# Patient Record
Sex: FEMALE | Race: WHITE | Employment: FULL TIME | URBAN - METROPOLITAN AREA
[De-identification: names, ages, dates, MRNs, and addresses within clinical notes are randomized per-mention and may not be internally consistent; named-entity substitution may affect disease eponyms.]

---

## 2017-04-17 ENCOUNTER — DOCUMENTATION ONLY (OUTPATIENT)
Dept: FAMILY MEDICINE CLINIC | Age: 42
End: 2017-04-17

## 2017-04-17 NOTE — PROGRESS NOTES
Rec'd medical records request from Jesusita Gamboa, faxed request to [employer] for processing on 04/14/17, confirmation rec'd.

## 2017-05-24 ENCOUNTER — OFFICE VISIT (OUTPATIENT)
Dept: FAMILY MEDICINE CLINIC | Age: 42
End: 2017-05-24

## 2017-05-24 VITALS
SYSTOLIC BLOOD PRESSURE: 112 MMHG | WEIGHT: 197 LBS | TEMPERATURE: 100 F | OXYGEN SATURATION: 99 % | HEIGHT: 68 IN | HEART RATE: 83 BPM | RESPIRATION RATE: 16 BRPM | BODY MASS INDEX: 29.86 KG/M2 | DIASTOLIC BLOOD PRESSURE: 83 MMHG

## 2017-05-24 DIAGNOSIS — G43.909 MIGRAINE WITHOUT STATUS MIGRAINOSUS, NOT INTRACTABLE, UNSPECIFIED MIGRAINE TYPE: Primary | ICD-10-CM

## 2017-05-24 RX ORDER — KETOROLAC TROMETHAMINE 30 MG/ML
60 INJECTION, SOLUTION INTRAMUSCULAR; INTRAVENOUS ONCE
Qty: 1 VIAL | Refills: 0
Start: 2017-05-24 | End: 2017-05-24

## 2017-05-24 RX ORDER — PREDNISONE 20 MG/1
20 TABLET ORAL 3 TIMES DAILY
Qty: 9 TAB | Refills: 0 | Status: SHIPPED | OUTPATIENT
Start: 2017-05-24 | End: 2017-07-18 | Stop reason: ALTCHOICE

## 2017-05-24 RX ORDER — SUMATRIPTAN 20 MG/1
SPRAY NASAL
Qty: 6 CONTAINER | Refills: 5 | Status: SHIPPED | OUTPATIENT
Start: 2017-05-24 | End: 2019-11-26

## 2017-05-24 RX ORDER — TOPIRAMATE 25 MG/1
TABLET ORAL
Qty: 70 TAB | Refills: 0 | Status: SHIPPED | OUTPATIENT
Start: 2017-05-24 | End: 2017-07-31 | Stop reason: DRUGHIGH

## 2017-05-24 NOTE — MR AVS SNAPSHOT
Visit Information Date & Time Provider Department Dept. Phone Encounter #  
 5/24/2017  9:15 AM Hoda Hidalgo, NP [address] [facility] [phone] [id number] Follow-up Instructions Return if symptoms worsen or fail to improve. Your Appointments 7/18/2017  2:00 PM  
ESTABLISHED PATIENT with MD Gil Machado ([address] [facility]) Appt Note: AE/MAMMO DM pt  
 [address] [facility] [address] [place] [address]  
[phone]  
  
   
 [address]  
  
    
 7/18/2017  3:00 PM  
MAMMOGRAPHY with MAMMOGRAM, JOCELYNE Lacey ([address] [facility]) Appt Note: AE/MAMMO DM pt  
 [address] [facility] [address] [facility] [address]  
[address] Upcoming Health Maintenance Date Due DTaP/Tdap/Td series (1 - Tdap) 4/9/1996 PAP AKA CERVICAL CYTOLOGY 11/25/2016 COLONOSCOPY 11/1/2017 INFLUENZA AGE 9 TO ADULT 8/1/2017 Allergies as of 5/24/2017  Review Complete On: 5/24/2017 By: Cherrie Sow LPN Severity Noted Reaction Type Reactions Phenergan [Promethazine]  11/11/2013    Unknown (comments) Current Immunizations  Reviewed on 4/26/2016 Name Date Influenza Vaccine 10/15/2015 Not reviewed this visit You Were Diagnosed With   
  
 Codes Comments Migraine without status migrainosus, not intractable, unspecified migraine type    -  Primary ICD-10-CM: Y93.863 ICD-9-CM: 346.90 Vitals BP Pulse Temp Resp Height(growth percentile) Weight(growth percentile) 112/83 (BP 1 Location: Right arm, BP Patient Position: Sitting) 83 100 °F (37.8 °C) (Oral) 16 5' 8\" (1.727 m) 197 lb (89.4 kg) SpO2 BMI OB Status Smoking Status 99% 29.95 kg/m2 Hysterectomy Former Smoker Vitals History BMI and BSA Data  Body Mass Index Body Surface Area  
 29.95 kg/m 2 2.07 m 2  
 Preferred Pharmacy Pharmacy Name Phone [facility] [address] [facility]  [facility] [phone] Your Updated Medication List  
  
   
This list is accurate as of: 5/24/17  9:40 AM.  Always use your most recent med list.  
  
  
  
  
 ABILIFY 5 mg tablet Generic drug:  ARIPiprazole ADDERALL 10 mg tablet Generic drug:  dextroamphetamine-amphetamine Take 10 mg by mouth.  
  
 estradiol 2 mg (7.5 mcg /24 hour) vaginal ring Commonly known as:  ESTRING Insert ring vaginally. Replace every 3 months  
  
 gabapentin 300 mg capsule Commonly known as:  NEURONTIN  
  
 ketorolac tromethamine 60 mg/2 mL Soln Commonly known as:  TORADOL  
2 mL by IntraMUSCular route once for 1 dose. oxyCODONE IR 10 mg Tab immediate release tablet Commonly known as:  ROXICODONE  
  
 predniSONE 20 mg tablet Commonly known as:  Sherra Juan Take 1 Tab by mouth three (3) times daily. SUMAtriptan 20 mg/actuation nasal spray Commonly known as:  IMITREX Use 1 spray in each nostril at onset of migraine, may repeat in 2 hrs if needed  
  
 topiramate 25 mg tablet Commonly known as:  TOPAMAX Take 1 po qam x 1 wk, then Take 1 po BID x 1 wk, then Take 2 in AM and 1 in PM x 1 wk, then Take 2 po BID  
  
 traZODone 150 mg tablet Commonly known as:  DESYREL  
  
 varenicline 0.5 mg (11)- 1 mg (42) Dspk Commonly known as:  CHANTIX STARTER MARIANO Take 1 mg by mouth two (2) times daily (after meals). Prescriptions Sent to Pharmacy Refills  
 predniSONE (DELTASONE) 20 mg tablet 0 Sig: Take 1 Tab by mouth three (3) times daily. Class: Normal  
 Pharmacy: [facility] [place], [place] [address] [address] [place] [address] #: [phone] Route: Oral  
 topiramate (TOPAMAX) 25 mg tablet 0  Sig: Take 1 po qam x 1 wk, then Take 1 po BID x 1 wk, then Take 2 in AM and 1 in PM x 1 wk, then Take 2 po BID Class: Normal  
 [facility] [address] [facility] Ph #: [phone] SUMAtriptan (IMITREX) 20 mg/actuation nasal spray 5 Sig: Use 1 spray in each nostril at onset of migraine, may repeat in 2 hrs if needed Class: Normal  
 Pharmacy: [facility] [place] [facility] [address] [facility] Ph #: [phone] We Performed the Following KETOROLAC TROMETHAMINE INJ [ [facility]] [place] THER/PROPH/DIAG INJECTION, SUBCUT/IM [id number] CPT(R)] Follow-up Instructions Return if symptoms worsen or fail to improve. Patient Instructions Migraine Headache: Care Instructions Your Care Instructions Migraines are painful, throbbing headaches that often start on one side of the head. They may cause nausea and vomiting and make you sensitive to light, sound, or smell. Without treatment, migraines can last from 4 hours to a few days. Medicines can help prevent migraines or stop them after they have started. Your doctor can help you find which ones work best for you. Follow-up care is a key part of your treatment and safety. Be sure to make and go to all appointments, and call your doctor if you are having problems. It's also a good idea to know your test results and keep a list of the medicines you take. How can you care for yourself at home? · Do not drive if you have taken a prescription pain medicine. · Rest in a quiet, dark room until your headache is gone. Close your eyes, and try to relax or go to sleep. Don't watch TV or read. · Put a cold, moist cloth or cold pack on the painful area for 10 to 20 minutes at a time. Put a thin cloth between the cold pack and your skin. · Use a warm, moist towel or a heating pad set on low to relax tight shoulder and neck muscles. · Have someone gently massage your neck and shoulders. · Take your medicines exactly as prescribed. Call your doctor if you think you are having a problem with your medicine. You will get more details on the specific medicines your doctor prescribes. · Be careful not to take pain medicine more often than the instructions allow. You could get worse or more frequent headaches when the medicine wears off. To prevent migraines · Keep a headache diary so you can figure out what triggers your headaches. Avoiding triggers may help you prevent headaches. Record when each headache began, how long it lasted, and what the pain was like. (Was it throbbing, aching, stabbing, or dull?) Write down any other symptoms you had with the headache, such as nausea, flashing lights or dark spots, or sensitivity to bright light or loud noise. Note if the headache occurred near your period. List anything that might have triggered the headache. Triggers may include certain foods (chocolate, cheese, wine) or odors, smoke, bright light, stress, or lack of sleep. · If your doctor has prescribed medicine for your migraines, take it as directed. You may have medicine that you take only when you get a migraine and medicine that you take all the time to help prevent migraines. ¨ If your doctor has prescribed medicine for when you get a headache, take it at the first sign of a migraine, unless your doctor has given you other instructions. ¨ If your doctor has prescribed medicine to prevent migraines, take it exactly as prescribed. Call your doctor if you think you are having a problem with your medicine. · Find healthy ways to deal with stress. Migraines are most common during or right after stressful times. Take time to relax before and after you do something that has caused a migraine in the past. 
· Try to keep your muscles relaxed by keeping good posture.  Check your jaw, face, neck, and shoulder muscles for tension. Try to relax them. When you sit at a desk, change positions often. And make sure to stretch for 30 seconds each hour. · Get plenty of sleep and exercise. · Eat meals on a regular schedule. Avoid foods and drinks that often trigger migraines. These include chocolate, alcohol (especially red wine and port), aspartame, monosodium glutamate (MSG), and some additives found in foods (such as hot dogs, rosenberg, cold cuts, aged cheeses, and pickled foods). · Limit caffeine. Don't drink too much coffee, tea, or soda. But don't quit caffeine suddenly. That can also give you migraines. · Do not smoke or allow others to smoke around you. If you need help quitting, talk to your doctor about stop-smoking programs and medicines. These can increase your chances of quitting for good. · If you are taking birth control pills or hormone therapy, talk to your doctor about whether they are triggering your migraines. When should you call for help? Call 911 anytime you think you may need emergency care. For example, call if: 
· You have signs of a stroke. These may include: 
¨ Sudden numbness, paralysis, or weakness in your face, arm, or leg, especially on only one side of your body. ¨ Sudden vision changes. ¨ Sudden trouble speaking. ¨ Sudden confusion or trouble understanding simple statements. ¨ Sudden problems with walking or balance. ¨ A sudden, severe headache that is different from past headaches. Call your doctor now or seek immediate medical care if: 
· You have new or worse nausea and vomiting. · You have a new or higher fever. · Your headache gets much worse. Watch closely for changes in your health, and be sure to contact your doctor if: 
· You are not getting better after 2 days (48 hours). Where can you learn more? Go to http://curtis-harriett.info/. Enter G880 in the search box to learn more about \"Migraine Headache: Care Instructions. \" 
 Current as of: October 14, 2016 Content Version: 11.2 © 7562-3166 [employer] Care instructions adapted under license by [employer] (which disclaims liability or warranty for this information). If you have questions about a medical condition or this instruction, always ask your healthcare professional. [address] any warranty or liability for your use of this information. Introducing [facility]! Dear Amalia Jackson: Thank you for requesting a [employer] account. Our records indicate that you already have an active [employer] account. You can access your account anytime at [email or web address] Did you know that you can access your hospital and ER discharge instructions at any time in [employer]? You can also review all of your test results from your hospital stay or ER visit. Additional Information If you have questions, please visit the Frequently Asked Questions section of the [employer] website at [email or web address] Remember, [employer] is NOT to be used for urgent needs. For medical emergencies, dial 911. Now available from your iPhone and Android! Please provide this summary of care documentation to your next provider. Your primary care clinician is listed as TOBIAS LUND. If you have any questions after today's visit, please call [phone].

## 2017-05-24 NOTE — PROGRESS NOTES
Chief Complaint   Patient presents with    Headache     Started early this morning     she is a 43y.o. year old female who presents for evalution. Pt started with migraine at 4am this morning. Took Imitrex but hasn't helped, only took 1 dose. Has also been on Maxalt previously but did not help either. States migraine have been increasing in frequency past week or two. Reviewed PmHx, RxHx, FmHx, SocHx, AllgHx and updated and dated in the chart. Review of Systems - negative except as listed above in the HPI    Objective:     Vitals:    05/24/17 0927   BP: 112/83   Pulse: 83   Resp: 16   Temp: 100 °F (37.8 °C)   TempSrc: Oral   SpO2: 99%   Weight: 197 lb (89.4 kg)   Height: 5' 8\" (1.727 m)     Physical Examination: General appearance - alert, well appearing, and in mild to moderate distress and crying  Chest - clear to auscultation, no wheezes, rales or rhonchi, symmetric air entry  Heart - normal rate, regular rhythm, normal S1, S2, no murmurs, rubs, clicks or gallops  Neurological - alert, oriented, normal speech, no focal findings or movement disorder noted, cranial nerves II through XII intact, motor and sensory grossly normal bilaterally    Assessment/ Plan:   Chepe Maciel was seen today for headache. Diagnoses and all orders for this visit:    Migraine without status migrainosus, not intractable, unspecified migraine type  -     ketorolac tromethamine (TORADOL) 60 mg/2 mL soln; 2 mL by IntraMUSCular route once for 1 dose. -     KETOROLAC TROMETHAMINE INJ  -     [place] THER/PROPH/DIAG INJECTION, SUBCUT/IM  -     predniSONE (DELTASONE) 20 mg tablet; Take 1 Tab by mouth three (3) times daily. -     topiramate (TOPAMAX) 25 mg tablet; Take 1 po qam x 1 wk, then Take 1 po BID x 1 wk, then Take 2 in AM and 1 in PM x 1 wk, then Take 2 po BID  -     SUMAtriptan (IMITREX) 20 mg/actuation nasal spray; Use 1 spray in each nostril at onset of migraine, may repeat in 2 hrs if needed  New rxs.  F/U prn     Pt voiced understanding regarding plan of care. Follow-up Disposition:  Return if symptoms worsen or fail to improve. I have discussed the diagnosis with the patient and the intended plan as seen in the above orders. The patient has received an after-visit summary and questions were answered concerning future plans.      Medication Side Effects and Warnings were discussed with patient    Yomaira Kirk NP

## 2017-05-24 NOTE — PROGRESS NOTES
1. Have you been to the ER, urgent care clinic since your last visit? Hospitalized since your last visit? No    2. Have you seen or consulted any other health care providers outside of the [address] [place] [address] since your last visit? Include any pap smears or colon screening.  No     Chief Complaint   Patient presents with    Headache     Started early this morning

## 2017-05-24 NOTE — PATIENT INSTRUCTIONS
Migraine Headache: Care Instructions  Your Care Instructions  Migraines are painful, throbbing headaches that often start on one side of the head. They may cause nausea and vomiting and make you sensitive to light, sound, or smell. Without treatment, migraines can last from 4 hours to a few days. Medicines can help prevent migraines or stop them after they have started. Your doctor can help you find which ones work best for you. Follow-up care is a key part of your treatment and safety. Be sure to make and go to all appointments, and call your doctor if you are having problems. It's also a good idea to know your test results and keep a list of the medicines you take. How can you care for yourself at home? · Do not drive if you have taken a prescription pain medicine. · Rest in a quiet, dark room until your headache is gone. Close your eyes, and try to relax or go to sleep. Don't watch TV or read. · Put a cold, moist cloth or cold pack on the painful area for 10 to 20 minutes at a time. Put a thin cloth between the cold pack and your skin. · Use a warm, moist towel or a heating pad set on low to relax tight shoulder and neck muscles. · Have someone gently massage your neck and shoulders. · Take your medicines exactly as prescribed. Call your doctor if you think you are having a problem with your medicine. You will get more details on the specific medicines your doctor prescribes. · Be careful not to take pain medicine more often than the instructions allow. You could get worse or more frequent headaches when the medicine wears off. To prevent migraines  · Keep a headache diary so you can figure out what triggers your headaches. Avoiding triggers may help you prevent headaches. Record when each headache began, how long it lasted, and what the pain was like.  (Was it throbbing, aching, stabbing, or dull?) Write down any other symptoms you had with the headache, such as nausea, flashing lights or dark spots, or sensitivity to bright light or loud noise. Note if the headache occurred near your period. List anything that might have triggered the headache. Triggers may include certain foods (chocolate, cheese, wine) or odors, smoke, bright light, stress, or lack of sleep. · If your doctor has prescribed medicine for your migraines, take it as directed. You may have medicine that you take only when you get a migraine and medicine that you take all the time to help prevent migraines. ¨ If your doctor has prescribed medicine for when you get a headache, take it at the first sign of a migraine, unless your doctor has given you other instructions. ¨ If your doctor has prescribed medicine to prevent migraines, take it exactly as prescribed. Call your doctor if you think you are having a problem with your medicine. · Find healthy ways to deal with stress. Migraines are most common during or right after stressful times. Take time to relax before and after you do something that has caused a migraine in the past.  · Try to keep your muscles relaxed by keeping good posture. Check your jaw, face, neck, and shoulder muscles for tension. Try to relax them. When you sit at a desk, change positions often. And make sure to stretch for 30 seconds each hour. · Get plenty of sleep and exercise. · Eat meals on a regular schedule. Avoid foods and drinks that often trigger migraines. These include chocolate, alcohol (especially red wine and port), aspartame, monosodium glutamate (MSG), and some additives found in foods (such as hot dogs, rosenberg, cold cuts, aged cheeses, and pickled foods). · Limit caffeine. Don't drink too much coffee, tea, or soda. But don't quit caffeine suddenly. That can also give you migraines. · Do not smoke or allow others to smoke around you. If you need help quitting, talk to your doctor about stop-smoking programs and medicines. These can increase your chances of quitting for good.   · If you are taking birth control pills or hormone therapy, talk to your doctor about whether they are triggering your migraines. When should you call for help? Call 911 anytime you think you may need emergency care. For example, call if:  · You have signs of a stroke. These may include:  ¨ Sudden numbness, paralysis, or weakness in your face, arm, or leg, especially on only one side of your body. ¨ Sudden vision changes. ¨ Sudden trouble speaking. ¨ Sudden confusion or trouble understanding simple statements. ¨ Sudden problems with walking or balance. ¨ A sudden, severe headache that is different from past headaches. Call your doctor now or seek immediate medical care if:  · You have new or worse nausea and vomiting. · You have a new or higher fever. · Your headache gets much worse. Watch closely for changes in your health, and be sure to contact your doctor if:  · You are not getting better after 2 days (48 hours). Where can you learn more? Go to http://curtis-harriett.info/. Enter J289 in the search box to learn more about \"Migraine Headache: Care Instructions. \"  Current as of: October 14, 2016  Content Version: 11.2  © 5351-5246 [employer]. Care instructions adapted under license by [employer] (which disclaims liability or warranty for this information). If you have questions about a medical condition or this instruction, always ask your healthcare professional. [address] any warranty or liability for your use of this information.

## 2017-06-08 ENCOUNTER — OFFICE VISIT (OUTPATIENT)
Dept: FAMILY MEDICINE CLINIC | Age: 42
End: 2017-06-08

## 2017-06-08 VITALS
TEMPERATURE: 99.6 F | HEART RATE: 104 BPM | HEIGHT: 69 IN | WEIGHT: 199 LBS | SYSTOLIC BLOOD PRESSURE: 123 MMHG | DIASTOLIC BLOOD PRESSURE: 79 MMHG | OXYGEN SATURATION: 99 % | BODY MASS INDEX: 29.47 KG/M2 | RESPIRATION RATE: 16 BRPM

## 2017-06-08 DIAGNOSIS — M54.42 ACUTE MIDLINE LOW BACK PAIN WITH LEFT-SIDED SCIATICA: Primary | ICD-10-CM

## 2017-06-08 RX ORDER — VORTIOXETINE 10 MG/1
TABLET, FILM COATED ORAL
Refills: 5 | COMMUNITY
Start: 2017-05-26 | End: 2017-12-04

## 2017-06-08 RX ORDER — GABAPENTIN 400 MG/1
CAPSULE ORAL
COMMUNITY
Start: 2017-06-07 | End: 2017-12-04

## 2017-06-08 RX ORDER — METHOCARBAMOL 750 MG/1
750 TABLET, FILM COATED ORAL 3 TIMES DAILY
Qty: 60 TAB | Refills: 0 | Status: SHIPPED | OUTPATIENT
Start: 2017-06-08 | End: 2017-08-27 | Stop reason: SDUPTHER

## 2017-06-08 RX ORDER — DICLOFENAC SODIUM 75 MG/1
75 TABLET, DELAYED RELEASE ORAL
Qty: 60 TAB | Refills: 0 | Status: SHIPPED | OUTPATIENT
Start: 2017-06-08 | End: 2017-07-18

## 2017-06-08 RX ORDER — KETOROLAC TROMETHAMINE 30 MG/ML
60 INJECTION, SOLUTION INTRAMUSCULAR; INTRAVENOUS ONCE
Qty: 1 VIAL | Refills: 0
Start: 2017-06-08 | End: 2017-06-08

## 2017-06-08 NOTE — PROGRESS NOTES
Chief Complaint   Patient presents with    Spasms     Started noon on yesterday     she is a 43y.o. year old female who presents for evalution. Tuesday night pt started having some pain in back, thought had just \"tweaked it\". Yesterday pain was worse, started having spasms. Has been taking Tylenol, Ibuprofen, heat and ice. At first was midline and radiating to R side in same place. Now radiating down into left side and down leg, radiating more around R side as well. Reviewed PmHx, RxHx, FmHx, SocHx, AllgHx and updated and dated in the chart. Review of Systems - negative except as listed above in the HPI    Objective:     Vitals:    06/08/17 1437   BP: 123/79   Pulse: (!) 104   Resp: 16   Temp: 99.6 °F (37.6 °C)   TempSrc: Oral   SpO2: 99%   Weight: 199 lb (90.3 kg)   Height: 5' 9\" (1.753 m)     Physical Examination: General appearance - alert, well appearing, and in no distress  Chest - clear to auscultation, no wheezes, rales or rhonchi, symmetric air entry  Heart - normal rate, regular rhythm, normal S1, S2, no murmurs, rubs, clicks or gallops  Back exam - limited range of motion, pain with motion noted during exam, tenderness noted lower lumbar and SI joints bilaterally, paraspinous muscle spasm     Assessment/ Plan:   Rd Klein was seen today for spasms. Diagnoses and all orders for this visit:    Acute midline low back pain with left-sided sciatica  -     ketorolac tromethamine (TORADOL) 60 mg/2 mL soln; 2 mL by IntraMUSCular route once for 1 dose. -     KETOROLAC TROMETHAMINE INJ  -     [place] THER/PROPH/DIAG INJECTION, SUBCUT/IM  -     methocarbamol (ROBAXIN) 750 mg tablet; Take 1 Tab by mouth three (3) times daily. -     diclofenac EC (VOLTAREN) 75 mg EC tablet; Take 1 Tab by mouth two (2) times daily (after meals). New rxs. Activity modification, gentle stretching. F/U prn     Pt voiced understanding regarding plan of care.      Follow-up Disposition:  Return if symptoms worsen or fail to improve. I have discussed the diagnosis with the patient and the intended plan as seen in the above orders. The patient has received an after-visit summary and questions were answered concerning future plans.      Medication Side Effects and Warnings were discussed with patient    Elaine Crane NP

## 2017-06-08 NOTE — PROGRESS NOTES
1. Have you been to the ER, urgent care clinic since your last visit? Hospitalized since your last visit? No    2. Have you seen or consulted any other health care providers outside of the [address] [place] [address] since your last visit? Include any pap smears or colon screening.  No     Chief Complaint   Patient presents with    Spasms     Started noon on yesterday

## 2017-06-08 NOTE — MR AVS SNAPSHOT
Visit Information Date & Time Provider Department Dept. Phone Encounter #  
 6/8/2017  2:30 PM Ashleigh Bobby, NP [address] [facility] [phone] [id number] Follow-up Instructions Return if symptoms worsen or fail to improve. Your Appointments 7/18/2017  2:00 PM  
ESTABLISHED PATIENT with [address], MD Gil Lacey ([facility] Appt Note: AE/MAMMO DM pt  
 [address] [place] [address]  
[phone]  
  
   
 [address]  
  
    
 7/18/2017  3:00 PM  
MAMMOGRAPHY with MAMMOGRAM, JOCELYNE Lacey ([facility]) Appt Note: AE/MAMMO DM pt  
 [address] [facility] [address] [address]  
[address] Upcoming Health Maintenance Date Due DTaP/Tdap/Td series (1 - Tdap) 4/9/1996 PAP AKA CERVICAL CYTOLOGY 11/25/2016 COLONOSCOPY 11/1/2017 INFLUENZA AGE 9 TO ADULT 8/1/2017 Allergies as of 6/8/2017  Review Complete On: 6/8/2017 By: Sole Bey LPN Severity Noted Reaction Type Reactions Phenergan [Promethazine]  11/11/2013    Unknown (comments) Current Immunizations  Reviewed on 4/26/2016 Name Date Influenza Vaccine 10/15/2015 Not reviewed this visit You Were Diagnosed With   
  
 Codes Comments Acute midline low back pain with left-sided sciatica    -  Primary ICD-10-CM: M54.42 
ICD-9-CM: 724.2, 724.3 Vitals BP Pulse Temp Resp Height(growth percentile) Weight(growth percentile) 123/79 (!) 104 99.6 °F (37.6 °C) (Oral) 16 5' 9\" (1.753 m) 199 lb (90.3 kg) SpO2 BMI OB Status Smoking Status 99% 29.39 kg/m2 Hysterectomy Former Smoker Vitals History BMI and BSA Data Body Mass Index Body Surface Area  
 29.39 kg/m 2 2.1 m 2 Preferred Pharmacy Pharmacy Name Phone [facility], [address] AT [facility] OF  [facility] [phone] Your Updated Medication List  
  
   
This list is accurate as of: 6/8/17  2:46 PM.  Always use your most recent med list.  
  
  
  
  
 ABILIFY 5 mg tablet Generic drug:  ARIPiprazole ADDERALL 10 mg tablet Generic drug:  dextroamphetamine-amphetamine Take 10 mg by mouth. diclofenac EC 75 mg EC tablet Commonly known as:  VOLTAREN Take 1 Tab by mouth two (2) times daily (after meals). estradiol 2 mg (7.5 mcg /24 hour) vaginal ring Commonly known as:  ESTRING Insert ring vaginally. Replace every 3 months  
  
 gabapentin 300 mg capsule Commonly known as:  NEURONTIN  
  
 ketorolac tromethamine 60 mg/2 mL Soln Commonly known as:  TORADOL  
2 mL by IntraMUSCular route once for 1 dose. methocarbamol 750 mg tablet Commonly known as:  ROBAXIN Take 1 Tab by mouth three (3) times daily. oxyCODONE IR 10 mg Tab immediate release tablet Commonly known as:  ROXICODONE  
  
 predniSONE 20 mg tablet Commonly known as:  Fiordaliza Duane Take 1 Tab by mouth three (3) times daily. SUMAtriptan 20 mg/actuation nasal spray Commonly known as:  IMITREX Use 1 spray in each nostril at onset of migraine, may repeat in 2 hrs if needed  
  
 topiramate 25 mg tablet Commonly known as:  TOPAMAX Take 1 po qam x 1 wk, then Take 1 po BID x 1 wk, then Take 2 in AM and 1 in PM x 1 wk, then Take 2 po BID  
  
 traZODone 150 mg tablet Commonly known as:  DESYREL  
  
 varenicline 0.5 mg (11)- 1 mg (42) Dspk Commonly known as:  CHANTIX STARTER MARIANO Take 1 mg by mouth two (2) times daily (after meals). Prescriptions Sent to Pharmacy Refills  
 methocarbamol (ROBAXIN) 750 mg tablet 0 Sig: Take 1 Tab by mouth three (3) times daily.   
 Class: Normal  
 Pharmacy: [address] [facility], [address] [facility] [address] [facility] Ph #: [phone] Route: Oral  
 diclofenac EC (VOLTAREN) 75 mg EC tablet 0 Sig: Take 1 Tab by mouth two (2) times daily (after meals). Class: Normal  
 Pharmacy: [facility] [place], [address] [facility] Ph #: [phone] Route: Oral  
  
We Performed the Following KETOROLAC TROMETHAMINE INJ [ [facility]] MS THER/PROPH/DIAG INJECTION, SUBCUT/IM [id number] CPT(R)] Follow-up Instructions Return if symptoms worsen or fail to improve. Introducing \[facility]\"" & HEALTH SERVICES! Dear Louie Moraes: Thank you for requesting a [employer] account. Our records indicate that you already have an active [employer] account. You can access your account anytime at [email or web address] Did you know that you can access your hospital and ER discharge instructions at any time in [employer]? You can also review all of your test results from your hospital stay or ER visit. Additional Information If you have questions, please visit the Frequently Asked Questions section of the [employer] website at [email or web address] Remember, [employer] is NOT to be used for urgent needs. For medical emergencies, dial 911. Now available from your iPhone and Android! Please provide this summary of care documentation to your next provider. Your primary care clinician is listed as TOBIAS LUND. If you have any questions after today's visit, please call [phone].

## 2017-07-18 ENCOUNTER — APPOINTMENT (OUTPATIENT)
Dept: MAMMOGRAPHY | Age: 42
End: 2017-07-18

## 2017-07-18 ENCOUNTER — OFFICE VISIT (OUTPATIENT)
Dept: OBGYN CLINIC | Age: 42
End: 2017-07-18

## 2017-07-18 VITALS
WEIGHT: 201 LBS | HEART RATE: 77 BPM | DIASTOLIC BLOOD PRESSURE: 65 MMHG | HEIGHT: 69 IN | BODY MASS INDEX: 29.77 KG/M2 | SYSTOLIC BLOOD PRESSURE: 101 MMHG

## 2017-07-18 DIAGNOSIS — Z01.419 ENCOUNTER FOR GYNECOLOGICAL EXAMINATION: Primary | ICD-10-CM

## 2017-07-18 DIAGNOSIS — Z12.31 ENCOUNTER FOR SCREENING MAMMOGRAM FOR MALIGNANT NEOPLASM OF BREAST: ICD-10-CM

## 2017-07-18 NOTE — PATIENT INSTRUCTIONS

## 2017-07-18 NOTE — PROGRESS NOTES
Annual exam ages 40-58 post hysterectomy    Eneida Mayfield is a ,  43 y.o. female [facility] No LMP recorded. Patient has had a hysterectomy. PARADISE/BSO for endometriosis. She presents for her annual checkup. She is having some vaginal dryness. Needs refill of Estring . Occ UMESH. Trouble losing weight. Doesn't exercise regularly. Not following specific diet, but generally eats healthy. [employer]. Doesn't snack. Cut out diet sodas. With regard to the Gardasil vaccine, she is older than the age for which it is FDA approved. Hormonal status:  She reports no perimenstrual type symptoms. She is not having vasomotor symptoms. The patient is on Estring. Ran out, wants RX. Sexual history:    She  reports that she currently engages in sexual activity and has had male partners. She reports using the following method of birth control/protection: Surgical.    Medical conditions:    Since her last annual GYN exam about one year ago, she has not the following changes in her health history: none. Surgical history confirmed with patient. has a past surgical history that includes hysteroscopy with endometrial ablation; appendectomy; colectomy; colonoscopy; dilation and curettage; paradise and bso (07); and orthopaedic (2013). Pap and Mammogram History:    Her most recent Pap smear was normal, obtained on 13. The patient had her mammogram today in our office. Breast Cancer History/Substance Abuse: negative    Osteoporosis History:    Family history does not include a first or second degree relative with osteopenia or osteoporosis.         Past Medical History:   Diagnosis Date    ADHD (attention deficit hyperactivity disorder)     Ankylosing spondylitis (HCC)     Endometriosis     Fibroids     Fibromyalgia     Infertility, female     Screening for malignant neoplasm of the cervix 13    Negative ; HPV Negative     Past Surgical History:   Procedure Laterality Date    HX APPENDECTOMY      HX COLECTOMY      HX COLONOSCOPY      HX DILATION AND CURETTAGE      HX HYSTEROSCOPY WITH ENDOMETRIAL ABLATION      HX ORTHOPAEDIC  jan/july 2013    left foot surgery cyst removed    HX PARADISE AND BSO  1/22/07    due to endometriosis       Current Outpatient Prescriptions   Medication Sig Dispense Refill    methocarbamol (ROBAXIN) 750 mg tablet Take 1 Tab by mouth three (3) times daily. 60 Tab 0    gabapentin (NEURONTIN) 400 mg capsule       TRINTELLIX 10 mg tablet TK 1 T PO QD  5    SUMAtriptan (IMITREX) 20 mg/actuation nasal spray Use 1 spray in each nostril at onset of migraine, may repeat in 2 hrs if needed 6 Container 5    estradiol (ESTRING) 2 mg vaginal ring Insert ring vaginally. Replace every 3 months 1 Each 4    traZODone (DESYREL) 150 mg tablet   2    oxyCODONE IR (ROXICODONE) 10 mg tab immediate release tablet   0    ABILIFY 5 mg tablet   2    diclofenac EC (VOLTAREN) 75 mg EC tablet Take 1 Tab by mouth two (2) times daily (after meals). 60 Tab 0    predniSONE (DELTASONE) 20 mg tablet Take 1 Tab by mouth three (3) times daily. 9 Tab 0    topiramate (TOPAMAX) 25 mg tablet Take 1 po qam x 1 wk, then Take 1 po BID x 1 wk, then Take 2 in AM and 1 in PM x 1 wk, then Take 2 po BID 70 Tab 0    varenicline (CHANTIX STARTER MARIANO) 0.5 mg (11)- 1 mg (42) DsPk Take 1 mg by mouth two (2) times daily (after meals). 1 Dose Pack 0    dextroamphetamine-amphetamine (ADDERALL) 10 mg tablet Take 10 mg by mouth. Allergies: Phenergan [promethazine]     Tobacco History:  reports that she quit smoking 4 days ago. She has never used smokeless tobacco.  Alcohol Abuse:  reports that she does not drink alcohol. Drug Abuse:  reports that she does not use illicit drugs.     Family Medical/Cancer History:   Family History   Problem Relation Age of Onset    Alcohol abuse Other     Arthritis-rheumatoid Other     Stroke Other     Cancer Other      bladder and tongue    Stroke Maternal Grandmother         Review of Systems - History obtained from the patient  Constitutional: negative for weight loss, fever, night sweats  HEENT: negative for hearing loss, earache, congestion, snoring, sorethroat  CV: negative for chest pain, palpitations, edema  Resp: negative for cough, shortness of breath, wheezing  GI: negative for change in bowel habits, abdominal pain, black or bloody stools  : negative for frequency, dysuria, hematuria, vaginal discharge  MSK: negative for back pain, joint pain, muscle pain  Breast: negative for breast lumps, nipple discharge, galactorrhea  Skin :negative for itching, rash, hives  Neuro: negative for dizziness, headache, confusion, weakness  Psych: negative for anxiety, depression, change in mood  Heme/lymph: negative for bleeding, bruising, pallor    Physical Exam    Visit Vitals    /65    Pulse 77    Ht 5' 9\" (1.753 m)    Wt 201 lb (91.2 kg)    BMI 29.68 kg/m2     Constitutional  · Appearance: well-nourished, well developed, alert, in no acute distress    HENT  · Head and Face: appears normal    Neck  · Inspection/Palpation: normal appearance, no masses or tenderness  · Lymph Nodes: no lymphadenopathy present  · Thyroid: gland size normal, nontender, no nodules or masses present on palpation    Chest  · Respiratory Effort: breathing unlabored  · Auscultation: normal breath sounds    Cardiovascular  · Heart:  · Auscultation: regular rate and rhythm without murmur    Breasts  · Inspection of Breasts: breasts symmetrical, no skin changes, no discharge present, nipple appearance normal, no skin retraction present  · Palpation of Breasts and Axillae: no masses present on palpation, no breast tenderness  · Axillary Lymph Nodes: no lymphadenopathy present    Gastrointestinal  · Abdominal Examination: abdomen non-tender to palpation, normal bowel sounds, no masses present  · Liver and spleen: no hepatomegaly present, spleen not palpable  · Hernias: no hernias identified    Genitourinary  · External Genitalia: normal appearance for age, no discharge present, no tenderness present, no inflammatory lesions present, no masses present, no atrophy present  · Vagina: normal vaginal vault without central or paravaginal defects, no discharge present, no inflammatory lesions present, no masses present  · Bladder: non-tender to palpation  · Urethra: appears normal  · Cervix: absent  · Uterus: absent  · Adnexa: no adnexal tenderness present, no adnexal masses present  · Perineum: perineum within normal limits, no evidence of trauma, no rashes or skin lesions present  · Anus: anus within normal limits, no hemorrhoids present  · Inguinal Lymph Nodes: no lymphadenopathy present    Skin  · General Inspection: no rash, no lesions identified    Neurologic/Psychiatric  · Mental Status:  · Orientation: grossly oriented to person, place and time  · Mood and Affect: mood normal, affect appropriate    Assessment:  Routine gynecologic examination  Her current medical status is satisfactory with no evidence of significant gynecologic issues. Plan:  Counseled re: diet, exercise, healthy lifestyle  Return for yearly wellness visits  Rec annual mammogram.  Will do today in our office. eRX Estring    Orders Placed This Encounter    estradiol (ESTRING) 2 mg (7.5 mcg /24 hour) vaginal ring     Sig: Insert ring vaginally.   Replace every 3 months     Dispense:  1 Each     Refill:  4

## 2017-08-03 ENCOUNTER — OFFICE VISIT (OUTPATIENT)
Dept: FAMILY MEDICINE CLINIC | Age: 42
End: 2017-08-03

## 2017-08-03 VITALS
HEART RATE: 83 BPM | HEIGHT: 69 IN | OXYGEN SATURATION: 98 % | SYSTOLIC BLOOD PRESSURE: 104 MMHG | TEMPERATURE: 98.7 F | BODY MASS INDEX: 28.9 KG/M2 | DIASTOLIC BLOOD PRESSURE: 68 MMHG | RESPIRATION RATE: 18 BRPM | WEIGHT: 195.13 LBS

## 2017-08-03 DIAGNOSIS — E55.9 VITAMIN D DEFICIENCY: ICD-10-CM

## 2017-08-03 DIAGNOSIS — G43.909 MIGRAINE WITHOUT STATUS MIGRAINOSUS, NOT INTRACTABLE, UNSPECIFIED MIGRAINE TYPE: ICD-10-CM

## 2017-08-03 DIAGNOSIS — Z00.00 ROUTINE GENERAL MEDICAL EXAMINATION AT A HEALTH CARE FACILITY: Primary | ICD-10-CM

## 2017-08-03 RX ORDER — TOPIRAMATE 25 MG/1
TABLET ORAL
Qty: 70 TAB | Refills: 0 | Status: SHIPPED | OUTPATIENT
Start: 2017-08-03 | End: 2017-10-13 | Stop reason: SDUPTHER

## 2017-08-03 NOTE — PROGRESS NOTES
1. Have you been to the ER, urgent care clinic since your last visit? Hospitalized since your last visit? No    2. Have you seen or consulted any other health care providers outside of the [address] [place] [address] since your last visit? Include any pap smears or colon screening.  No   Chief Complaint   Patient presents with    Complete Physical     CPE- work physical   [facility]     labs

## 2017-08-03 NOTE — PROGRESS NOTES
Mary Arredondo is a 43 y.o. female presenting for their annual checkup. Follows a low fat diet?  no.  Dietary recall:  4 meals a day, lots of vegetables, some fruits, drinks mostly diet soda or water   Follow an exercise program?  No   Hours of sleep?  8 hrs   Changes in bowel or bladder habits?  no  Up to date on Tdap (<10 years)? Yes, done at [facility]   Feels stable and well emotionally? Yes     Current concerns include: Needs to restart Topamax rx. Past Medical History:   Diagnosis Date    ADHD (attention deficit hyperactivity disorder)     Ankylosing spondylitis (HCC)     Endometriosis     Fibroids     Fibromyalgia     Hx of mammogram 07/18/2017    Negative. No mammographic evidence of malignancy.  Infertility, female     Screening for malignant neoplasm of the cervix 11/25/13    Negative ; HPV Negative      Past Surgical History:   Procedure Laterality Date    HX APPENDECTOMY      HX COLECTOMY      HX COLONOSCOPY      HX DILATION AND CURETTAGE      HX HYSTEROSCOPY WITH ENDOMETRIAL ABLATION      HX ORTHOPAEDIC  jan/july 2013    left foot surgery cyst removed    HX PARADISE AND BSO  1/22/07    due to endometriosis      Prior to Admission medications    Medication Sig Start Date End Date Taking? Authorizing Provider   topiramate (TOPAMAX) 25 mg tablet Take 1 po qam x 1 wk, then Take 1 po BID x 1 wk, then Take 2 in AM and 1 in PM x 1 wk, then Take 2 po BID 8/3/17  Yes Yandel Pina NP   topiramate (TOPAMAX) 50 mg tablet Take 1 Tab by mouth two (2) times a day. 7/31/17  Yes Yandel Pina NP   estradiol (ESTRING) 2 mg (7.5 mcg /24 hour) vaginal ring Insert ring vaginally. Replace every 3 months 7/18/17  Yes Duy Ross MD   methocarbamol (ROBAXIN) 750 mg tablet Take 1 Tab by mouth three (3) times daily.  6/8/17  Yes Yandel Pina NP   gabapentin (NEURONTIN) 400 mg capsule  6/7/17  Yes Historical Provider   TRINTELLIX 10 mg tablet TK 1 T PO QD 5/26/17  Yes Historical Provider SUMAtriptan (IMITREX) 20 mg/actuation nasal spray Use 1 spray in each nostril at onset of migraine, may repeat in 2 hrs if needed 5/24/17  Yes Shanita Coffman NP   traZODone (DESYREL) 150 mg tablet  3/10/15  Yes Historical Provider   oxyCODONE IR (ROXICODONE) 10 mg tab immediate release tablet  3/10/15  Yes Historical Provider   ABILIFY 5 mg tablet  3/21/15  Yes Historical Provider     Allergies   Allergen Reactions    Phenergan [Promethazine] Unknown (comments)      Social History   Substance Use Topics    Smoking status: Former Smoker     Quit date: 7/14/2017    Smokeless tobacco: Never Used      Comment: Never used vapor or e-cigs     Alcohol use No      Family History   Problem Relation Age of Onset    Alcohol abuse Other     Arthritis-rheumatoid Other     Stroke Other     Cancer Other      bladder and tongue    Stroke Maternal Grandmother       Review of Systems -   Psychological ROS: negative  Ophthalmic ROS: negative  ENT ROS: negative  Endocrine ROS: negative  Breast ROS: negative  Respiratory ROS: no cough, shortness of breath, or wheezing  Cardiovascular ROS: no chest pain or dyspnea on exertion  Gastrointestinal ROS: no abdominal pain, change in bowel habits, or black or bloody stools  Genito-Urinary ROS: no dysuria, trouble voiding, or hematuria  Musculoskeletal ROS: negative  Neurological ROS: no TIA or stroke symptoms  Dermatological ROS: negative    Objective:  Visit Vitals    /68 (BP 1 Location: Left arm, BP Patient Position: Sitting)    Pulse 83    Temp 98.7 °F (37.1 °C) (Oral)    Resp 18    Ht 5' 9\" (1.753 m)    Wt 195 lb 2 oz (88.5 kg)    SpO2 98%    BMI 28.81 kg/m2     The physical exam is generally normal. Patient appears well, alert and oriented x 3, pleasant, cooperative. Vitals are as noted. Neck supple and free of adenopathy, or masses. No thyromegaly. NAN. Ears, throat are normal. Lungs are clear to auscultation.  Heart sounds are normal, no murmurs, clicks, gallops or rubs. Abdomen is soft, no tenderness, masses or organomegaly. Breasts: patient declines to have breast exam. Self exam is encouraged. Pelvis: exam declined by the patient. Extremities are normal. Peripheral pulses are normal. Screening neurological exam is normal without focal findings. Skin is normal without suspicious lesions noted. Assessment/Plan:  Diagnoses and all orders for this visit:    1. Routine general medical examination at a health care facility  -     CBC WITH AUTOMATED DIFF  -     METABOLIC PANEL, COMPREHENSIVE  -     LIPID PANEL    2. Migraine without status migrainosus, not intractable, unspecified migraine type  -     topiramate (TOPAMAX) 25 mg tablet; Take 1 po qam x 1 wk, then Take 1 po BID x 1 wk, then Take 2 in AM and 1 in PM x 1 wk, then Take 2 po BID    3. Vitamin D deficiency  -     VITAMIN D, 25 HYDROXY        Discussed importance of healthy diet and regular exercise, recommended multivitamin and sunscreen usage. Encouraged monthly self breast/testicular exam.      Follow-up Disposition:  Return if symptoms worsen or fail to improve.     Mikey Hawkins NP

## 2017-08-04 ENCOUNTER — PATIENT MESSAGE (OUTPATIENT)
Dept: FAMILY MEDICINE CLINIC | Age: 42
End: 2017-08-04

## 2017-08-04 DIAGNOSIS — E78.2 ELEVATED TRIGLYCERIDES WITH HIGH CHOLESTEROL: Primary | ICD-10-CM

## 2017-08-04 LAB
25(OH)D3+25(OH)D2 SERPL-MCNC: 28.5 NG/ML (ref 30–100)
ALBUMIN SERPL-MCNC: 4.8 G/DL (ref 3.5–5.5)
ALBUMIN/GLOB SERPL: 2.4 {RATIO} (ref 1.2–2.2)
ALP SERPL-CCNC: 103 IU/L (ref 39–117)
ALT SERPL-CCNC: 19 IU/L (ref 0–32)
AST SERPL-CCNC: 16 IU/L (ref 0–40)
BASOPHILS # BLD AUTO: 0 X10E3/UL (ref 0–0.2)
BASOPHILS NFR BLD AUTO: 0 %
BILIRUB SERPL-MCNC: <0.2 MG/DL (ref 0–1.2)
BUN SERPL-MCNC: 10 MG/DL (ref 6–24)
BUN/CREAT SERPL: 11 (ref 9–23)
CALCIUM SERPL-MCNC: 9.8 MG/DL (ref 8.7–10.2)
CHLORIDE SERPL-SCNC: 104 MMOL/L (ref 96–106)
CHOLEST SERPL-MCNC: 230 MG/DL (ref 100–199)
CO2 SERPL-SCNC: 23 MMOL/L (ref 18–29)
CREAT SERPL-MCNC: 0.94 MG/DL (ref 0.57–1)
EOSINOPHIL # BLD AUTO: 0.1 X10E3/UL (ref 0–0.4)
EOSINOPHIL NFR BLD AUTO: 1 %
ERYTHROCYTE [DISTWIDTH] IN BLOOD BY AUTOMATED COUNT: 12.9 % (ref 12.3–15.4)
GLOBULIN SER CALC-MCNC: 2 G/DL (ref 1.5–4.5)
GLUCOSE SERPL-MCNC: 90 MG/DL (ref 65–99)
HCT VFR BLD AUTO: 40.3 % (ref 34–46.6)
HDLC SERPL-MCNC: 37 MG/DL
HGB BLD-MCNC: 13.5 G/DL (ref 11.1–15.9)
IMM GRANULOCYTES # BLD: 0 X10E3/UL (ref 0–0.1)
IMM GRANULOCYTES NFR BLD: 0 %
INTERPRETATION, 910389: NORMAL
LDLC SERPL CALC-MCNC: 131 MG/DL (ref 0–99)
LYMPHOCYTES # BLD AUTO: 2.9 X10E3/UL (ref 0.7–3.1)
LYMPHOCYTES NFR BLD AUTO: 38 %
MCH RBC QN AUTO: 30.6 PG (ref 26.6–33)
MCHC RBC AUTO-ENTMCNC: 33.5 G/DL (ref 31.5–35.7)
MCV RBC AUTO: 91 FL (ref 79–97)
MONOCYTES # BLD AUTO: 0.8 X10E3/UL (ref 0.1–0.9)
MONOCYTES NFR BLD AUTO: 10 %
NEUTROPHILS # BLD AUTO: 3.7 X10E3/UL (ref 1.4–7)
NEUTROPHILS NFR BLD AUTO: 51 %
PLATELET # BLD AUTO: 263 X10E3/UL (ref 150–379)
POTASSIUM SERPL-SCNC: 4.7 MMOL/L (ref 3.5–5.2)
PROT SERPL-MCNC: 6.8 G/DL (ref 6–8.5)
RBC # BLD AUTO: 4.41 X10E6/UL (ref 3.77–5.28)
SODIUM SERPL-SCNC: 143 MMOL/L (ref 134–144)
TRIGL SERPL-MCNC: 311 MG/DL (ref 0–149)
VLDLC SERPL CALC-MCNC: 62 MG/DL (ref 5–40)
WBC # BLD AUTO: 7.5 X10E3/UL (ref 3.4–10.8)

## 2017-08-04 NOTE — TELEPHONE ENCOUNTER
From: Bennie Galloway  To: Oxana López NP  Sent: 8/4/2017 2:19 PM EDT  Subject: Test Results Question    Hello, hello, hello! Zoraida Almanza figured my cholesterol results would be horrible because I saw you so late in the afternoon yesterday and that hot dog at lunch must have pushed me over the edge. I'll setup an appointment to come super early in the morning so that we can get more accurate numbers. :o)    Thanks!   Ayush Saeed

## 2017-08-04 NOTE — PROGRESS NOTES
Cholesterol too high, recommend starting on medication  Vit D low, can send in rx or pt can take daily supplement

## 2017-10-13 ENCOUNTER — OFFICE VISIT (OUTPATIENT)
Dept: FAMILY MEDICINE CLINIC | Age: 42
End: 2017-10-13

## 2017-10-13 VITALS
OXYGEN SATURATION: 99 % | TEMPERATURE: 97.9 F | HEART RATE: 86 BPM | BODY MASS INDEX: 29.77 KG/M2 | WEIGHT: 201 LBS | SYSTOLIC BLOOD PRESSURE: 113 MMHG | DIASTOLIC BLOOD PRESSURE: 76 MMHG | RESPIRATION RATE: 16 BRPM | HEIGHT: 69 IN

## 2017-10-13 DIAGNOSIS — G43.909 MIGRAINE WITHOUT STATUS MIGRAINOSUS, NOT INTRACTABLE, UNSPECIFIED MIGRAINE TYPE: ICD-10-CM

## 2017-10-13 DIAGNOSIS — Z23 ENCOUNTER FOR IMMUNIZATION: ICD-10-CM

## 2017-10-13 DIAGNOSIS — K64.4 EXTERNAL HEMORRHOID: ICD-10-CM

## 2017-10-13 DIAGNOSIS — L29.0 RECTAL ITCHING: Primary | ICD-10-CM

## 2017-10-13 RX ORDER — TOPIRAMATE 25 MG/1
TABLET ORAL
Qty: 70 TAB | Refills: 0 | Status: SHIPPED | OUTPATIENT
Start: 2017-10-13 | End: 2018-02-16

## 2017-10-13 RX ORDER — HYDROCORTISONE 25 MG/G
CREAM TOPICAL
Qty: 30 G | Refills: 0 | Status: SHIPPED | OUTPATIENT
Start: 2017-10-13 | End: 2018-02-16

## 2017-10-13 NOTE — PROGRESS NOTES
Chief Complaint   Patient presents with    Rash     Buttocks, x1 week    Weight Management     she is a 43y.o. year old female who presents for evalution. Pt states had some anal itching last week, has been worsening. Now feels like rash is in area and is painful - hurts to even sit. Has had some bright red blood with bowel movements but only intermittently. Has not tried any OTCs. Pt would also like to discuss weight management. Pt states tries to watch diet but isn't very careful. Is on Gabapentin and feels constantly hungry. Never restarted Topamax, asking for refill to be sent in today. Reviewed PmHx, RxHx, FmHx, SocHx, AllgHx and updated and dated in the chart. Review of Systems - negative except as listed above in the HPI    Objective:     Vitals:    10/13/17 1438   BP: 113/76   Pulse: 86   Resp: 16   Temp: 97.9 °F (36.6 °C)   TempSrc: Oral   SpO2: 99%   Weight: 201 lb (91.2 kg)   Height: 5' 9\" (1.753 m)     Physical Examination: General appearance - alert, well appearing, and in no distress  Chest - clear to auscultation, no wheezes, rales or rhonchi, symmetric air entry  Heart - normal rate, regular rhythm, normal S1, S2, no murmurs, rubs, clicks or gallops  Rectal - external hemorrhoids non-thrombosed    Assessment/ Plan:   Diagnoses and all orders for this visit:    1. Rectal itching  -     hydrocortisone (ANUSOL-HC) 2.5 % rectal cream; Insert  into rectum four (4) times daily as needed for Hemorrhoids. New rx. F/U prn    2. External hemorrhoid  Reassurance provided, self limiting. 3. Migraine without status migrainosus, not intractable, unspecified migraine type  -     topiramate (TOPAMAX) 25 mg tablet; Take 1 po qam x 1 wk, then Take 1 po BID x 1 wk, then Take 2 in AM and 1 in PM x 1 wk, then Take 2 po BID  Restart rx. May also help with appetite.      4. Encounter for immunization  -     Influenza vaccine (QUADRIVALENT VIAL)  IM (72964)  -     [place] IMMUNIZ ADMIN,1 SINGLE/COMB VAC/TOXOID     Pt voiced understanding regarding plan of care. Follow-up Disposition:  Return if symptoms worsen or fail to improve. I have discussed the diagnosis with the patient and the intended plan as seen in the above orders. The patient has received an after-visit summary and questions were answered concerning future plans.      Medication Side Effects and Warnings were discussed with patient    Timo Garcia, NP

## 2017-10-13 NOTE — PROGRESS NOTES
1. Have you been to the ER, urgent care clinic since your last visit? Hospitalized since your last visit? No    2. Have you seen or consulted any other health care providers outside of the [address] [place] [address] since your last visit? Include any pap smears or colon screening.  No   Chief Complaint   Patient presents with    Rash     Buttocks, x1 week    Weight Management

## 2017-10-13 NOTE — PATIENT INSTRUCTIONS

## 2017-10-13 NOTE — MR AVS SNAPSHOT
Visit Information Date & Time Provider Department Dept. Phone Encounter #  
 10/13/2017  2:30 PM Keenan Cuevas NP [address] [facility] [phone] [id number] Follow-up Instructions Return if symptoms worsen or fail to improve. Your Appointments 7/19/2018  2:20 PM  
MAMMOGRAPHY with MAMMOGRAM, JOCELYNE Lacey ([facility]) Appt Note: mammo and ae DM  
 [address] [facility] [address]  
[address]  
  
    
 7/19/2018  2:40 PM  
ESTABLISHED PATIENT with MD Gil Sims ([facility]) Appt Note: mammo and ae DM  
 [address] [facility] 99 [address]  
[address] Upcoming Health Maintenance Date Due DTaP/Tdap/Td series (1 - Tdap) 4/9/1996 INFLUENZA AGE 9 TO ADULT 8/1/2017 COLONOSCOPY 11/1/2017 BREAST CANCER SCRN MAMMOGRAM 7/18/2018 PAP AKA CERVICAL CYTOLOGY 8/3/2020 Allergies as of 10/13/2017  Review Complete On: 10/13/2017 By: Sam Us LPN Severity Noted Reaction Type Reactions Phenergan [Promethazine]  11/11/2013    Unknown (comments) Current Immunizations  Reviewed on 4/26/2016 Name Date Influenza Vaccine 10/15/2015 Influenza Vaccine (Quad)  Incomplete Not reviewed this visit You Were Diagnosed With   
  
 Codes Comments Rectal itching    -  Primary ICD-10-CM: L29.0 ICD-9-CM: 698.0 External hemorrhoid     ICD-10-CM: K64.4 ICD-9-CM: 455.3 Migraine without status migrainosus, not intractable, unspecified migraine type     ICD-10-CM: G43.909 ICD-9-CM: 346.90 Encounter for immunization     ICD-10-CM: D86 ICD-9-CM: V03.89 Vitals BP Pulse Temp Resp Height(growth percentile) Weight(growth percentile) 113/76 86 97.9 °F (36.6 °C) (Oral) 16 5' 9\" (1.753 m) 201 lb (91.2 kg) SpO2 BMI OB Status Smoking Status 99% 29.68 kg/m2 Hysterectomy Former Smoker BMI and BSA Data Body Mass Index Body Surface Area  
 29.68 kg/m 2 2.11 m 2 Preferred Pharmacy Pharmacy Name Phone Milo Silva, Olamide Lara [address] AT [facility] OF  [facility] [phone] Your Updated Medication List  
  
   
This list is accurate as of: 10/13/17  2:54 PM.  Always use your most recent med list.  
  
  
  
  
 ABILIFY 5 mg tablet Generic drug:  ARIPiprazole  
  
 estradiol 2 mg (7.5 mcg /24 hour) vaginal ring Commonly known as:  ESTRING Insert ring vaginally. Replace every 3 months  
  
 gabapentin 400 mg capsule Commonly known as:  NEURONTIN  
  
 hydrocortisone 2.5 % rectal cream  
Commonly known as:  ANUSOL-HC Insert  into rectum four (4) times daily as needed for Hemorrhoids. methocarbamol 750 mg tablet Commonly known as:  ROBAXIN Take 1 Tab by mouth three (3) times daily as needed. oxyCODONE IR 10 mg Tab immediate release tablet Commonly known as:  ROXICODONE  
  
 SUMAtriptan 20 mg/actuation nasal spray Commonly known as:  IMITREX Use 1 spray in each nostril at onset of migraine, may repeat in 2 hrs if needed  
  
 topiramate 25 mg tablet Commonly known as:  TOPAMAX Take 1 po qam x 1 wk, then Take 1 po BID x 1 wk, then Take 2 in AM and 1 in PM x 1 wk, then Take 2 po BID  
  
 traZODone 150 mg tablet Commonly known as:  DESYREL  
  
 TRINTELLIX 10 mg tablet Generic drug:  vortioxetine TK 1 T PO QD Prescriptions Sent to Pharmacy Refills  
 hydrocortisone (ANUSOL-HC) 2.5 % rectal cream 0 Sig: Insert  into rectum four (4) times daily as needed for Hemorrhoids.   
 Class: Normal  
 Pharmacy: [id number] [facility], [address] [facility] 3719 [facility] Ph #: [phone] Route: Rectal  
 topiramate (TOPAMAX) 25 mg tablet 0 Sig: Take 1 po qam x 1 wk, then Take 1 po BID x 1 wk, then Take 2 in AM and 1 in PM x 1 wk, then Take 2 po BID Class: Normal  
 Pharmacy: [facility] [place], [address] [facility] Ph #: [phone] We Performed the Following INFLUENZA VACCINE QUADRIVALENT VIAL, SPLIT, 3 YRS PLUS IM [id number] CPT(R)] NC IMMUNIZ ADMIN,1 SINGLE/COMB VAC/TOXOID [id number] CPT(R)] Follow-up Instructions Return if symptoms worsen or fail to improve. Patient Instructions Hemorrhoids: Care Instructions Your Care Instructions Hemorrhoids are enlarged veins that develop in the anal canal. Bleeding during bowel movements, itching, swelling, and rectal pain are the most common symptoms. They can be uncomfortable at times, but hemorrhoids rarely are a serious problem. You can treat most hemorrhoids with simple changes to your diet and bowel habits. These changes include eating more fiber and not straining to pass stools. Most hemorrhoids do not need surgery or other treatment unless they are very large and painful or bleed a lot. Follow-up care is a key part of your treatment and safety. Be sure to make and go to all appointments, and call your doctor if you are having problems. Its also a good idea to know your test results and keep a list of the medicines you take. How can you care for yourself at home? · Sit in a few inches of warm water (sitz bath) 3 times a day and after bowel movements. The warm water helps with pain and itching. · Put ice on your anal area several times a day for 10 minutes at a time. Put a thin cloth between the ice and your skin. Follow this by placing a warm, wet towel on the area for another 10 to 20 minutes. · Take pain medicines exactly as directed. ¨ If the doctor gave you a prescription medicine for pain, take it as prescribed. ¨ If you are not taking a prescription pain medicine, ask your doctor if you can take an over-the-counter medicine. · Keep the anal area clean, but be gentle. Use water and a fragrance-free soap, such as [place], or use baby wipes or medicated pads, such as Tucks. · Wear cotton underwear and loose clothing to decrease moisture in the anal area. · Eat more fiber. Include foods such as whole-grain breads and cereals, raw vegetables, raw and dried fruits, and beans. · Drink plenty of fluids, enough so that your urine is light yellow or clear like water. If you have kidney, heart, or liver disease and have to limit fluids, talk with your doctor before you increase the amount of fluids you drink. · Use a stool softener that contains bran or psyllium. You can save money by buying bran or psyllium (available in bulk at most health food stores) and sprinkling it on foods or stirring it into fruit juice. Or you can use a product such as Metamucil or Hydrocil. · Practice healthy bowel habits. ¨ Go to the bathroom as soon as you have the urge. ¨ Avoid straining to pass stools. Relax and give yourself time to let things happen naturally. ¨ Do not hold your breath while passing stools. ¨ Do not read while sitting on the toilet. Get off the toilet as soon as you have finished. · Take your medicines exactly as prescribed. Call your doctor if you think you are having a problem with your medicine. When should you call for help? Call 911 anytime you think you may need emergency care. For example, call if: 
· You pass maroon or very bloody stools. Call your doctor now or seek immediate medical care if: 
· You have increased pain. · You have increased bleeding. Watch closely for changes in your health, and be sure to contact your doctor if: 
· Your symptoms have not improved after 3 or 4 days. Where can you learn more? Go to http://curtis-harriett.info/. Enter F228 in the search box to learn more about \"Hemorrhoids: Care Instructions. \" Current as of: August 9, 2016 Content Version: 11.3 © 9121-3677 [employer]. Care instructions adapted under license by [employer] (which disclaims liability or warranty for this information). If you have questions about a medical condition or this instruction, always ask your healthcare professional. [address] any warranty or liability for your use of this information. Introducing [facility] & HEALTH SERVICES! Dear Artur: Thank you for requesting a BoldIQ account. Our records indicate that you already have an active BoldIQ account. You can access your account anytime at [email or web address] Did you know that you can access your hospital and ER discharge instructions at any time in BoldIQ? You can also review all of your test results from your hospital stay or ER visit. Additional Information If you have questions, please visit the Frequently Asked Questions section of the BoldIQ website at [email or web address] Remember, BoldIQ is NOT to be used for urgent needs. For medical emergencies, dial 911. Now available from your iPhone and Android! Please provide this summary of care documentation to your next provider. Your primary care clinician is listed as TOBIAS LUND. If you have any questions after today's visit, please call [phone].

## 2017-12-04 ENCOUNTER — OFFICE VISIT (OUTPATIENT)
Dept: FAMILY MEDICINE CLINIC | Age: 42
End: 2017-12-04

## 2017-12-04 VITALS
HEIGHT: 69 IN | WEIGHT: 201 LBS | HEART RATE: 118 BPM | RESPIRATION RATE: 16 BRPM | OXYGEN SATURATION: 99 % | TEMPERATURE: 99.3 F | SYSTOLIC BLOOD PRESSURE: 128 MMHG | DIASTOLIC BLOOD PRESSURE: 91 MMHG | BODY MASS INDEX: 29.77 KG/M2

## 2017-12-04 DIAGNOSIS — F41.9 ANXIETY: ICD-10-CM

## 2017-12-04 DIAGNOSIS — M79.7 FIBROMYALGIA: Primary | ICD-10-CM

## 2017-12-04 RX ORDER — TRAZODONE HYDROCHLORIDE 150 MG/1
150 TABLET ORAL
Qty: 90 TAB | Refills: 1 | Status: SHIPPED | OUTPATIENT
Start: 2017-12-04 | End: 2018-07-17 | Stop reason: SDUPTHER

## 2017-12-04 RX ORDER — BUSPIRONE HYDROCHLORIDE 30 MG/1
30 TABLET ORAL 2 TIMES DAILY
Qty: 180 TAB | Refills: 1 | Status: SHIPPED | OUTPATIENT
Start: 2017-12-04 | End: 2018-09-10 | Stop reason: SDUPTHER

## 2017-12-04 RX ORDER — NALOXONE HYDROCHLORIDE 4 MG/.1ML
SPRAY NASAL
Refills: 0 | COMMUNITY
Start: 2017-11-03 | End: 2018-02-16

## 2017-12-04 RX ORDER — BUSPIRONE HYDROCHLORIDE 30 MG/1
TABLET ORAL
Refills: 3 | COMMUNITY
Start: 2017-11-16 | End: 2017-12-04 | Stop reason: SDUPTHER

## 2017-12-04 RX ORDER — GABAPENTIN 400 MG/1
400 CAPSULE ORAL 3 TIMES DAILY
Qty: 360 CAP | Refills: 1 | Status: SHIPPED | OUTPATIENT
Start: 2017-12-04 | End: 2018-10-29 | Stop reason: SDUPTHER

## 2017-12-04 NOTE — MR AVS SNAPSHOT
Visit Information Date & Time Provider Department Dept. Phone Encounter #  
 12/4/2017  1:30 PM Andrew Salcido NP 59[address] [facility] [phone] [id number] Follow-up Instructions Return if symptoms worsen or fail to improve. Your Appointments 7/19/2018  2:20 PM  
MAMMOGRAPHY with MAMMOGRAM, JOCELYNE Lacey ([address]) Appt Note: mammo and ae DM  
 [address] [facility] [address]  
[address]  
  
    
 7/19/2018  2:40 PM  
ESTABLISHED PATIENT with MD Gil Alexandra ([address] [facility]) Appt Note: mammo and ae DM  
 [address] [address]  
[address] Upcoming Health Maintenance Date Due  
 BREAST CANCER SCRN MAMMOGRAM 7/18/2018 PAP AKA CERVICAL CYTOLOGY 8/3/2020 DTaP/Tdap/Td series (2 - Td) 12/4/2027 COLONOSCOPY 12/4/2027 Allergies as of 12/4/2017  Review Complete On: 12/4/2017 By: Tracey Schwarz LPN Severity Noted Reaction Type Reactions Phenergan [Promethazine]  11/11/2013    Unknown (comments) Current Immunizations  Reviewed on 4/26/2016 Name Date Influenza Vaccine 10/15/2015 Influenza Vaccine Robby Boycebecky) 10/13/2017 Not reviewed this visit You Were Diagnosed With   
  
 Codes Comments Fibromyalgia    -  Primary ICD-10-CM: M79.7 ICD-9-CM: 729.1 Anxiety     ICD-10-CM: F41.9 ICD-9-CM: 300.00 Vitals BP Pulse Temp Resp Height(growth percentile) Weight(growth percentile) (!) 128/91 (!) 118 99.3 °F (37.4 °C) (Oral) 16 5' 9\" (1.753 m) 201 lb (91.2 kg) SpO2 BMI OB Status Smoking Status 99% 29.68 kg/m2 Hysterectomy Former Smoker BMI and BSA Data Body Mass Index Body Surface Area  
 29.68 kg/m 2 2.11 m 2 Preferred Pharmacy Pharmacy Name Phone Olamide Rebollar 74[address] AT [facility] OF  [facility] [phone] Your Updated Medication List  
  
   
This list is accurate as of: 12/4/17  1:36 PM.  Always use your most recent med list.  
  
  
  
  
 ABILIFY 5 mg tablet Generic drug:  ARIPiprazole  
  
 busPIRone 30 mg tablet Commonly known as:  BUSPAR Take 1 Tab by mouth two (2) times a day. estradiol 2 mg (7.5 mcg /24 hour) vaginal ring Commonly known as:  ESTRING Insert ring vaginally. Replace every 3 months  
  
 gabapentin 400 mg capsule Commonly known as:  NEURONTIN Take 1 Cap by mouth three (3) times daily. hydrocortisone 2.5 % rectal cream  
Commonly known as:  ANUSOL-HC Insert  into rectum four (4) times daily as needed for Hemorrhoids. methocarbamol 750 mg tablet Commonly known as:  ROBAXIN Take 1 Tab by mouth three (3) times daily as needed. NARCAN 4 mg/actuation nasal spray Generic drug:  naloxone INSTILL 1 SPRAY PRN  
  
 oxyCODONE IR 10 mg Tab immediate release tablet Commonly known as:  ROXICODONE  
  
 SUMAtriptan 20 mg/actuation nasal spray Commonly known as:  IMITREX Use 1 spray in each nostril at onset of migraine, may repeat in 2 hrs if needed  
  
 topiramate 25 mg tablet Commonly known as:  TOPAMAX Take 1 po qam x 1 wk, then Take 1 po BID x 1 wk, then Take 2 in AM and 1 in PM x 1 wk, then Take 2 po BID  
  
 traZODone 150 mg tablet Commonly known as:  Obed Nipomo Take 1 Tab by mouth nightly. vortioxetine 5 mg tablet Commonly known as:  TRINTELLIX Take 1 Tab by mouth daily. Prescriptions Sent to Pharmacy Refills  
 gabapentin (NEURONTIN) 400 mg capsule 1 Sig: Take 1 Cap by mouth three (3) times daily. Class: Normal  
 Pharmacy: [facility] [place], [address] [place] [address] #: [phone]  Route: Oral  
 traZODone (DESYREL) 150 mg tablet 1 Sig: Take 1 Tab by mouth nightly. Class: Normal  
 Pharmacy: [facility] [address] Ph #: [phone] Route: Oral  
 busPIRone (BUSPAR) 30 mg tablet 1 Sig: Take 1 Tab by mouth two (2) times a day. Class: Normal  
 Pharmacy: [facility] [address] Ph #: [phone] Route: Oral  
 vortioxetine (TRINTELLIX) 5 mg tablet 1 Sig: Take 1 Tab by mouth daily. Class: Normal  
 Pharmacy: [facility] [address] Ph #: [phone] Route: Oral  
  
Follow-up Instructions Return if symptoms worsen or fail to improve. Introducing [facility] & HEALTH SERVICES! Dear Lawson Mendosa: Thank you for requesting a Bridgestream account. Our records indicate that you already have an active Bridgestream account. You can access your account anytime at [email or web address]. [email or web address] Did you know that you can access your hospital and ER discharge instructions at any time in Bridgestream? You can also review all of your test results from your hospital stay or ER visit. Additional Information If you have questions, please visit the Frequently Asked Questions section of the Bridgestream website at [email or web address] Remember, Bridgestream is NOT to be used for urgent needs. For medical emergencies, dial 911. Now available from your iPhone and Android! Please provide this summary of care documentation to your next provider. Your primary care clinician is listed as TOBIAS LUND. If you have any questions after today's visit, please call [phone].

## 2017-12-04 NOTE — PROGRESS NOTES
Chief Complaint   Patient presents with    Medication Evaluation     All Meds     she is a 43y.o. year old female who presents for evalution. Pt states would like to start having Gabapentin, Trillinex, Buspar, and Trazodone prescribed through us. Is doing well on all those meds and is stable on those dosages, just doesn't want to have to keep going to specialist for stable conditions. Reviewed PmHx, RxHx, FmHx, SocHx, AllgHx and updated and dated in the chart. Review of Systems - negative except as listed above in the HPI    Objective:     Vitals:    12/04/17 1327   BP: (!) 128/91   Pulse: (!) 118   Resp: 16   Temp: 99.3 °F (37.4 °C)   TempSrc: Oral   SpO2: 99%   Weight: 201 lb (91.2 kg)   Height: 5' 9\" (1.753 m)     Physical Examination: General appearance - alert, well appearing, and in no distress  Mental status - alert, oriented to person, place, and time, normal mood, behavior, speech, dress, motor activity, and thought processes  Chest - clear to auscultation, no wheezes, rales or rhonchi, symmetric air entry  Heart - normal rate, regular rhythm, normal S1, S2, no murmurs, rubs, clicks or gallops    Assessment/ Plan:   Diagnoses and all orders for this visit:    1. Fibromyalgia  -     gabapentin (NEURONTIN) 400 mg capsule; Take 1 Cap by mouth three (3) times daily. Stable, cont current meds. F/U 6 mo.     2. Anxiety  -     traZODone (DESYREL) 150 mg tablet; Take 1 Tab by mouth nightly. -     busPIRone (BUSPAR) 30 mg tablet; Take 1 Tab by mouth two (2) times a day. -     vortioxetine (TRINTELLIX) 5 mg tablet; Take 1 Tab by mouth daily. Stable, refills provided. F/U 6 mo. Pt voiced understanding regarding plan of care. Follow-up Disposition:  Return if symptoms worsen or fail to improve. I have discussed the diagnosis with the patient and the intended plan as seen in the above orders.   The patient has received an after-visit summary and questions were answered concerning future plans.     Medication Side Effects and Warnings were discussed with patient    Kalin Mccartney NP

## 2017-12-04 NOTE — PROGRESS NOTES
1. Have you been to the ER, urgent care clinic since your last visit? Hospitalized since your last visit? No    2. Have you seen or consulted any other health care providers outside of the [address] [place] [address] since your last visit? Include any pap smears or colon screening.  No     Chief Complaint   Patient presents with    Medication Evaluation     All Meds

## 2018-02-16 ENCOUNTER — OFFICE VISIT (OUTPATIENT)
Dept: FAMILY MEDICINE CLINIC | Age: 43
End: 2018-02-16

## 2018-02-16 VITALS
HEIGHT: 69 IN | TEMPERATURE: 98.8 F | DIASTOLIC BLOOD PRESSURE: 78 MMHG | WEIGHT: 205 LBS | OXYGEN SATURATION: 99 % | HEART RATE: 85 BPM | SYSTOLIC BLOOD PRESSURE: 123 MMHG | RESPIRATION RATE: 16 BRPM | BODY MASS INDEX: 30.36 KG/M2

## 2018-02-16 DIAGNOSIS — G43.909 MIGRAINE WITHOUT STATUS MIGRAINOSUS, NOT INTRACTABLE, UNSPECIFIED MIGRAINE TYPE: Primary | ICD-10-CM

## 2018-02-16 DIAGNOSIS — E66.9 OBESITY (BMI 30-39.9): ICD-10-CM

## 2018-02-16 RX ORDER — KETOROLAC TROMETHAMINE 10 MG/1
10 TABLET, FILM COATED ORAL
Qty: 10 TAB | Refills: 2 | Status: SHIPPED | OUTPATIENT
Start: 2018-02-16 | End: 2018-05-30

## 2018-02-16 NOTE — MR AVS SNAPSHOT
[address] [address] 
[phone] Patient: Pan Shin MRN:  [id number] Visit Information Date & Time Provider Department Dept. Phone Encounter #  
 2018  3:15 PM Verito Funk  [facility] [phone] [id number] Follow-up Instructions Return in about 3 months (around 2018) for weight F/U . Your Appointments 2018  2:20 PM  
MAMMOGRAPHY with JOCELYNE VILLANUEVA ([facility]) Appt Note: mammo and ae DM  
 [address] [facility] [address]  
[address]  
  
    
 2018  2:40 PM  
ESTABLISHED PATIENT with MD Gil Alfaro ([facility]) Appt Note: mammo and ae DM  
 [address] [facility] [address] [facility] 99 [address]  
[address] Upcoming Health Maintenance Date Due  
 BREAST CANCER SCRN MAMMOGRAM 2018 PAP AKA CERVICAL CYTOLOGY 8/3/2020 DTaP/Tdap/Td series (2 - Td) 2027 COLONOSCOPY 2027 Allergies as of 2018  Review Complete On: 2018 By: Benjie Dunne LPN Severity Noted Reaction Type Reactions Phenergan [Promethazine]  2013    Unknown (comments) Current Immunizations  Reviewed on 2016 Name Date Influenza Vaccine 10/15/2015 Influenza Vaccine [facility]) 10/13/2017 Not reviewed this visit You Were Diagnosed With   
  
 Codes Comments Migraine without status migrainosus, not intractable, unspecified migraine type    -  Primary ICD-10-CM: L79.961 ICD-9-CM: 346.90 Obesity (BMI 30-39. 9)     ICD-10-CM: E66.9 ICD-9-CM: 278.00 Vitals BP Pulse Temp Resp Height(growth percentile) Weight(growth percentile) 123/78 85 98.8 °F (37.1 °C) (Oral) 16 5' 9\" (1.753 m) 205 lb (93 kg) SpO2 BMI OB Status Smoking Status 99% 30.27 kg/m2 Hysterectomy Former Smoker Vitals History BMI and BSA Data Body Mass Index Body Surface Area  
 30.27 kg/m 2 2.13 m 2 Preferred Pharmacy Pharmacy Name Phone Olamide Rebollar 9174 AT [facility] OF  [facility] [phone] Your Updated Medication List  
  
   
This list is accurate as of: 2/16/18  3:44 PM.  Always use your most recent med list.  
  
  
  
  
 busPIRone 30 mg tablet Commonly known as:  BUSPAR Take 1 Tab by mouth two (2) times a day.  
  
 gabapentin 400 mg capsule Commonly known as:  NEURONTIN Take 1 Cap by mouth three (3) times daily. ketorolac 10 mg tablet Commonly known as:  TORADOL Take 1 Tab by mouth every six (6) hours as needed for Pain. * phentermine-topiramate 3.75-23 mg Cm24 Commonly known as:  QSYMIA Take 1 Tab by mouth daily. Max Daily Amount: 1 Tab. * phentermine-topiramate 7.5-46 mg Cm24 Commonly known as:  QSYMIA Take 1 Tab by mouth daily. Max Daily Amount: 1 Tab. SUMAtriptan 20 mg/actuation nasal spray Commonly known as:  IMITREX Use 1 spray in each nostril at onset of migraine, may repeat in 2 hrs if needed  
  
 traZODone 150 mg tablet Commonly known as:  Marguerite Shack Take 1 Tab by mouth nightly. vortioxetine 5 mg tablet Commonly known as:  TRINTELLIX Take 1 Tab by mouth daily. * Notice: This list has 2 medication(s) that are the same as other medications prescribed for you. Read the directions carefully, and ask your doctor or other care provider to review them with you. Prescriptions Printed Refills  
 phentermine-topiramate (QSYMIA) 3.75-23 mg CM24 0 Sig: Take 1 Tab by mouth daily. Max Daily Amount: 1 Tab. Class: Print  Route: Oral  
 phentermine-topiramate (QSYMIA) 7.5-46 mg CM24 2 Sig: Take 1 Tab by mouth daily. Max Daily Amount: 1 Tab. Class: Print Route: Oral  
  
Prescriptions Sent to Pharmacy Refills  
 ketorolac (TORADOL) 10 mg tablet 2 Sig: Take 1 Tab by mouth every six (6) hours as needed for Pain. Class: Normal  
 [facility] [place], [address] [facility] #: [phone] Route: Oral  
  
Follow-up Instructions Return in about 3 months (around 5/16/2018) for weight F/U . Introducing [facility] & [facility] SERVICES! Dear Joe Russ: Thank you for requesting a 5 O'[employer] Records account. Our records indicate that you already have an active 5 O'[employer] Records account. You can access your account anytime at [email or web address] Did you know that you can access your hospital and ER discharge instructions at any time in 5 O'Clock Records? You can also review all of your test results from your hospital stay or ER visit. Additional Information If you have questions, please visit the Frequently Asked Questions section of the 5 [employer] Records website at [email or web address] Remember, 5 [employer] Records is NOT to be used for urgent needs. For medical emergencies, dial 911. Now available from your iPhone and Android! Please provide this summary of care documentation to your next provider. Your primary care clinician is listed as TOBIAS LUND. If you have any questions after today's visit, please call [phone].

## 2018-02-16 NOTE — PROGRESS NOTES
1. Have you been to the ER, urgent care clinic since your last visit? Hospitalized since your last visit? No    2. Have you seen or consulted any other health care providers outside of the [address] [place] [address] since your last visit? Include any pap smears or colon screening.  No   Chief Complaint   Patient presents with    Medication Evaluation

## 2018-02-16 NOTE — PROGRESS NOTES
Chief Complaint   Patient presents with    Medication Evaluation     she is a 43y.o. year old female who presents for evalution. Pt here to discuss migraines. About a week ago had terrible migraine, took Imitrex nasal spray but didn't help. Ended up at [facility], given Toradol injection which was very helpful. Pt requesting oral Toradol to have on hand for severe migraines. Pt interested in discussing weight loss medication. Has been working on diet and exercise but is not helping. Feels very depressed and hopeless about weight, would like to try rx. Reviewed PmHx, RxHx, FmHx, SocHx, AllgHx and updated and dated in the chart. Review of Systems - negative except as listed above in the HPI    Objective:     Vitals:    02/16/18 1522   BP: 123/78   Pulse: 85   Resp: 16   Temp: 98.8 °F (37.1 °C)   TempSrc: Oral   SpO2: 99%   Weight: 205 lb (93 kg)   Height: 5' 9\" (1.753 m)     Physical Examination: General appearance - alert, well appearing, and in no distress  Chest - clear to auscultation, no wheezes, rales or rhonchi, symmetric air entry  Heart - normal rate, regular rhythm, normal S1, S2, no murmurs, rubs, clicks or gallops  Neurological - alert, oriented, normal speech, no focal findings or movement disorder noted, cranial nerves II through XII intact, motor and sensory grossly normal bilaterally    Assessment/ Plan:   Diagnoses and all orders for this visit:    1. Migraine without status migrainosus, not intractable, unspecified migraine type  -     ketorolac (TORADOL) 10 mg tablet; Take 1 Tab by mouth every six (6) hours as needed for Pain. New rx. F/U prn    2. Obesity (BMI 30-39.9)  -     phentermine-topiramate (QSYMIA) 3.75-23 mg CM24; Take 1 Tab by mouth daily. Max Daily Amount: 1 Tab. -     phentermine-topiramate (QSYMIA) 7.5-46 mg CM24; Take 1 Tab by mouth daily. Max Daily Amount: 1 Tab. New rx. Cont lifestyle changes. F/U 3 mo. Pt voiced understanding regarding plan of care. Follow-up Disposition:  Return in about 3 months (around 5/16/2018) for weight F/U . I have discussed the diagnosis with the patient and the intended plan as seen in the above orders. The patient has received an after-visit summary and questions were answered concerning future plans.      Medication Side Effects and Warnings were discussed with patient    Piero Colon NP

## 2018-05-30 ENCOUNTER — OFFICE VISIT (OUTPATIENT)
Dept: FAMILY MEDICINE CLINIC | Age: 43
End: 2018-05-30

## 2018-05-30 VITALS
SYSTOLIC BLOOD PRESSURE: 121 MMHG | OXYGEN SATURATION: 99 % | HEIGHT: 69 IN | DIASTOLIC BLOOD PRESSURE: 82 MMHG | TEMPERATURE: 98.5 F | WEIGHT: 184 LBS | RESPIRATION RATE: 16 BRPM | HEART RATE: 96 BPM | BODY MASS INDEX: 27.25 KG/M2

## 2018-05-30 DIAGNOSIS — M25.561 ACUTE PAIN OF RIGHT KNEE: ICD-10-CM

## 2018-05-30 DIAGNOSIS — L50.9 HIVES: ICD-10-CM

## 2018-05-30 DIAGNOSIS — E66.3 OVERWEIGHT: Primary | ICD-10-CM

## 2018-05-30 RX ORDER — DICLOFENAC SODIUM 75 MG/1
75 TABLET, DELAYED RELEASE ORAL
Qty: 30 TAB | Refills: 0 | Status: SHIPPED | OUTPATIENT
Start: 2018-05-30 | End: 2018-07-18

## 2018-05-30 RX ORDER — METHYLPREDNISOLONE 4 MG/1
TABLET ORAL
Qty: 1 DOSE PACK | Refills: 0 | Status: SHIPPED | OUTPATIENT
Start: 2018-05-30 | End: 2019-02-22

## 2018-05-30 NOTE — PROGRESS NOTES
1. Have you been to the ER, urgent care clinic since your last visit? Hospitalized since your last visit? No    2. Have you seen or consulted any other health care providers outside of the [address] [place] [address] since your last visit? Include any pap smears or colon screening.  No     Chief Complaint   Patient presents with    Knee Pain     Right, x1 week    Medication Refill     Qsymia    Hives

## 2018-05-30 NOTE — PROGRESS NOTES
Chief Complaint   Patient presents with    Knee Pain     Right, x1 week    Medication Refill     Qsymia    Hives     she is a 37y.o. year old female who presents for evalution. Pt here for Qsymia F/U - has lost 21 lbs since last starting. Has been drinking a lot more water, at first wasn't drinking a lot but then was getting dehydrated. Was having terrible headaches at first, lasted about 2 weeks and then increased water which was helped and now headaches have resolved. Does have day where feels like vision is slightly blurry, reached out to  and is rare side effect. However, pt willing to continue taking. Pt also has been having tickle in throat with dry cough. Pt is not on any allergy medications. Pt states broke out in hives on Sunday night due to stress. Has been using OTC Cortisone cream and Claritin which has slightly helped. Still having terrible itching, requesting rx to help clear them completely. Hit knee about a week ago, has been having intermittent shooting pains. No swelling or bruising, has not tried any OTC. Pain very bad when pt puts direct pressure on it, like kneeling down - then will shoot pain up into thigh. Reviewed PmHx, RxHx, FmHx, SocHx, AllgHx and updated and dated in the chart.     Review of Systems - negative except as listed above in the HPI    Objective:     Vitals:    05/30/18 1552   BP: 121/82   Pulse: 96   Resp: 16   Temp: 98.5 °F (36.9 °C)   TempSrc: Oral   SpO2: 99%   Weight: 184 lb (83.5 kg)   Height: 5' 9\" (1.753 m)     Physical Examination: General appearance - alert, well appearing, and in no distress  Chest - clear to auscultation, no wheezes, rales or rhonchi, symmetric air entry  Heart - normal rate, regular rhythm, normal S1, S2, no murmurs, rubs, clicks or gallops  Musculoskeletal - Right knee: no joint tenderness, deformity or swelling  Skin - generalized hives, mild     Assessment/ Plan:   Diagnoses and all orders for this visit:    1. Overweight  -     phentermine-topiramate (QSYMIA) 7.5-46 mg CM24; Take 1 Tab by mouth daily. Max Daily Amount: 1 Tab. Cont current meds, likely will be able to stop after next 2-3 months. F/U prn    2. Hives  -     methylPREDNISolone (MEDROL DOSEPACK) 4 mg tablet; Use as directed x 6 days. Start Benadryl tonight and push fluids. No improvement then use new rx.     3. Acute pain of right knee  -     diclofenac EC (VOLTAREN) 75 mg EC tablet; Take 1 Tab by mouth two (2) times daily (after meals). New rx, pain likely secondary to inflammation. Application of ice, activity modification. F/U prn     Pt voiced understanding regarding plan of care. Follow-up Disposition:  Return in about 3 months (around 8/30/2018) for weight recheck . I have discussed the diagnosis with the patient and the intended plan as seen in the above orders. The patient has received an after-visit summary and questions were answered concerning future plans.      Medication Side Effects and Warnings were discussed with patient    German Enriquez NP

## 2018-05-30 NOTE — PATIENT INSTRUCTIONS
Hives: Care Instructions  Your Care Instructions  Hives are raised, red, itchy patches of skin. They are also called wheals or welts. They usually have red borders and pale centers. Hives range in size from ¼ inch to 3 inches or more across. They may seem to move from place to place on the skin. Several hives may form a large area of raised, red skin. You can get hives after an insect sting, after taking medicine or eating certain foods, or because of infection or stress. Other causes include plants, things you breathe in, makeup, heat, cold, sunlight, and latex. You cannot spread hives to other people. Hives may last a few minutes or a few days, but a single spot may last less than 36 hours. Follow-up care is a key part of your treatment and safety. Be sure to make and go to all appointments, and call your doctor if you are having problems. It's also a good idea to know your test results and keep a list of the medicines you take. How can you care for yourself at home? · Avoid whatever you think may have caused your hives, such as a certain food or medicine. However, you may not know the cause. · Put a cool, wet towel on the area to relieve itching. · Take an over-the-counter antihistamine, such as diphenhydramine (Benadryl), cetirizine (Zyrtec), or loratadine (Claritin), to help stop the hives and calm the itching. Read and follow directions on the label. These medicines can make you feel sleepy. Do not drive while using them. · Stay away from strong soaps, detergents, and chemicals. These can make itching worse. When should you call for help? Call 911 anytime you think you may need emergency care. For example, call if:  ? · You have symptoms of a severe allergic reaction. These may include:  ¨ Sudden raised, red areas (hives) all over your body. ¨ Swelling of the throat, mouth, lips, or tongue. ¨ Trouble breathing. ¨ Passing out (losing consciousness).  Or you may feel very lightheaded or suddenly feel weak, confused, or restless. ?Call your doctor now or seek immediate medical care if:  ? · You have symptoms of an allergic reaction, such as:  ¨ A rash or hives (raised, red areas on the skin). ¨ Itching. ¨ Swelling. ¨ Belly pain, nausea, or vomiting. ? · You get hives after you start a new medicine. ? · Hives have not gone away after 24 hours. ? Watch closely for changes in your health, and be sure to contact your doctor if:  ? · You do not get better as expected. Where can you learn more? Go to http://curtis-harriett.info/. Enter V209 in the search box to learn more about \"Hives: Care Instructions. \"  Current as of: March 20, 2017  Content Version: 11.4  © 7451-6594 [employer]. Care instructions adapted under license by [employer] (which disclaims liability or warranty for this information). If you have questions about a medical condition or this instruction, always ask your healthcare professional. [address] any warranty or liability for your use of this information.

## 2018-05-30 NOTE — MR AVS SNAPSHOT
[address] [address] 
[phone] Patient: Tito Holbrook MRN:  :[id number] Visit Information Date & Time Provider Department Dept. Phone Encounter #  
 2018  3:45 PM Emily Wood NP [address] [facility] [phone] [id number] Follow-up Instructions Return in about 3 months (around 2018) for weight recheck . Your Appointments 2018  2:20 PM  
MAMMOGRAPHY with MAMMJOCELYNE OLIVERA ([address]) Appt Note: mammo and ae DM  
 [address] [facility] [address]  
[address]  
  
    
 2018  2:40 PM  
ESTABLISHED PATIENT with Yung Colin MD  
[place] ([address]) Appt Note: mammo and ae DM  
 [address] [facility] [address] [address]  
[address] Upcoming Health Maintenance Date Due  
 BREAST CANCER SCRN MAMMOGRAM 2018 Influenza Age 5 to Adult 2018 PAP AKA CERVICAL CYTOLOGY 8/3/2020 DTaP/Tdap/Td series (2 - Td) 2027 COLONOSCOPY 2027 Allergies as of 2018  Review Complete On: 2018 By: Emily Wood NP Severity Noted Reaction Type Reactions Phenergan [Promethazine]  2013    Unknown (comments) Current Immunizations  Reviewed on 2016 Name Date Influenza Vaccine 10/15/2015 Influenza Vaccine Nata Chowdhury) 10/13/2017 Not reviewed this visit You Were Diagnosed With   
  
 Codes Comments Overweight    -  Primary ICD-10-CM: U85.9 ICD-9-CM: 278.02 Hives     ICD-10-CM: L50.9 ICD-9-CM: 708. 9 Acute pain of right knee     ICD-10-CM: M25.561 ICD-9-CM: 719.46 Vitals BP Pulse Temp Resp Height(growth percentile) Weight(growth percentile) 121/82 96 98.5 °F (36.9 °C) (Oral) 16 5' 9\" (1.753 m) 184 lb (83.5 kg) SpO2 BMI OB Status Smoking Status 99% 27.17 kg/m2 Hysterectomy Former Smoker BMI and BSA Data Body Mass Index Body Surface Area  
 27.17 kg/m 2 2.02 m 2 Preferred Pharmacy Pharmacy Name Phone Milo Silva, [address] [facility] [phone] Your Updated Medication List  
  
   
This list is accurate as of 5/30/18  4:04 PM.  Always use your most recent med list.  
  
  
  
  
 busPIRone 30 mg tablet Commonly known as:  BUSPAR Take 1 Tab by mouth two (2) times a day. diclofenac EC 75 mg EC tablet Commonly known as:  VOLTAREN Take 1 Tab by mouth two (2) times daily (after meals). gabapentin 400 mg capsule Commonly known as:  NEURONTIN Take 1 Cap by mouth three (3) times daily. methylPREDNISolone 4 mg tablet Commonly known as:  [place] Use as directed x 6 days. phentermine-topiramate 7.5-46 mg Cm24 Commonly known as:  QSYMIA Take 1 Tab by mouth daily. Max Daily Amount: 1 Tab. SUMAtriptan 20 mg/actuation nasal spray Commonly known as:  IMITREX Use 1 spray in each nostril at onset of migraine, may repeat in 2 hrs if needed  
  
 traZODone 150 mg tablet Commonly known as:  Anirudh Jay Take 1 Tab by mouth nightly. vortioxetine 5 mg tablet Commonly known as:  TRINTELLIX Take 1 Tab by mouth daily. Prescriptions Printed Refills  
 phentermine-topiramate (QSYMIA) 7.5-46 mg CM24 2 Sig: Take 1 Tab by mouth daily. Max Daily Amount: 1 Tab. Class: Print Route: Oral  
  
Prescriptions Sent to Pharmacy Refills  
 methylPREDNISolone (MEDROL DOSEPACK) 4 mg tablet 0 Sig: Use as directed x 6 days.   
 Class: Normal  
 Pharmacy: [id number] [facility], [address] [place] [address] [address] [facility] Ph #: [phone]  
 diclofenac EC (VOLTAREN) 75 mg EC tablet 0 Sig: Take 1 Tab by mouth two (2) times daily (after meals). Class: Normal  
 Pharmacy: [facility] [facility] Ph #: [phone] Route: Oral  
  
Follow-up Instructions Return in about 3 months (around 8/30/2018) for weight recheck . Patient Instructions Hives: Care Instructions Your Care Instructions Hives are raised, red, itchy patches of skin. They are also called wheals or welts. They usually have red borders and pale centers. Hives range in size from ¼ inch to 3 inches or more across. They may seem to move from place to place on the skin. Several hives may form a large area of raised, red skin. You can get hives after an insect sting, after taking medicine or eating certain foods, or because of infection or stress. Other causes include plants, things you breathe in, makeup, heat, cold, sunlight, and latex. You cannot spread hives to other people. Hives may last a few minutes or a few days, but a single spot may last less than 36 hours. Follow-up care is a key part of your treatment and safety. Be sure to make and go to all appointments, and call your doctor if you are having problems. It's also a good idea to know your test results and keep a list of the medicines you take. How can you care for yourself at home? · Avoid whatever you think may have caused your hives, such as a certain food or medicine. However, you may not know the cause. · Put a cool, wet towel on the area to relieve itching. · Take an over-the-counter antihistamine, such as diphenhydramine (Benadryl), cetirizine (Zyrtec), or loratadine (Claritin), to help stop the hives and calm the itching. Read and follow directions on the label. These medicines can make you feel sleepy. Do not drive while using them. · Stay away from strong soaps, detergents, and chemicals. These can make itching worse. When should you call for help? Call 911 anytime you think you may need emergency care. For example, call if: 
? · You have symptoms of a severe allergic reaction. These may include: 
¨ Sudden raised, red areas (hives) all over your body. ¨ Swelling of the throat, mouth, lips, or tongue. ¨ Trouble breathing. ¨ Passing out (losing consciousness). Or you may feel very lightheaded or suddenly feel weak, confused, or restless. ?Call your doctor now or seek immediate medical care if: 
? · You have symptoms of an allergic reaction, such as: ¨ A rash or hives (raised, red areas on the skin). ¨ Itching. ¨ Swelling. ¨ Belly pain, nausea, or vomiting. ? · You get hives after you start a new medicine. ? · Hives have not gone away after 24 hours. ? Watch closely for changes in your health, and be sure to contact your doctor if: 
? · You do not get better as expected. Where can you learn more? Go to http://curtis-harriett.info/. Enter O571 in the search box to learn more about \"Hives: Care Instructions. \" Current as of: March 20, 2017 Content Version: 11.4 © 2398-0607 [employer]. Care instructions adapted under license by [employer] (which disclaims liability or warranty for this information). If you have questions about a medical condition or this instruction, always ask your healthcare professional. [address] [address] any warranty or liability for your use of this information. Introducing [facility]! Dear Tomasa Gil: Thank you for requesting a [employer] account. Our records indicate that you already have an active [employer] account. You can access your account anytime at [email or web address] Did you know that you can access your hospital and ER discharge instructions at any time in [employer]?   You can also review all of your test results from your hospital stay or ER visit. Additional Information If you have questions, please visit the Frequently Asked Questions section of the [employer] website at [email or web address] Remember, [employer] is NOT to be used for urgent needs. For medical emergencies, dial 911. Now available from your iPhone and Android! Please provide this summary of care documentation to your next provider. Your primary care clinician is listed as TOBIAS LUND. If you have any questions after today's visit, please call [phone].

## 2018-06-13 ENCOUNTER — OFFICE VISIT (OUTPATIENT)
Dept: FAMILY MEDICINE CLINIC | Age: 43
End: 2018-06-13

## 2018-06-13 VITALS
TEMPERATURE: 98.5 F | OXYGEN SATURATION: 98 % | SYSTOLIC BLOOD PRESSURE: 101 MMHG | HEART RATE: 95 BPM | HEIGHT: 69 IN | DIASTOLIC BLOOD PRESSURE: 67 MMHG | RESPIRATION RATE: 16 BRPM | WEIGHT: 183 LBS | BODY MASS INDEX: 27.11 KG/M2

## 2018-06-13 DIAGNOSIS — M25.561 CHRONIC PAIN OF RIGHT KNEE: ICD-10-CM

## 2018-06-13 DIAGNOSIS — G89.29 CHRONIC PAIN OF RIGHT KNEE: ICD-10-CM

## 2018-06-13 DIAGNOSIS — R20.2 PARESTHESIA OF RIGHT LEG: ICD-10-CM

## 2018-06-13 DIAGNOSIS — R25.2 MUSCLE CRAMPING: ICD-10-CM

## 2018-06-13 DIAGNOSIS — H53.9 VISION CHANGES: Primary | ICD-10-CM

## 2018-06-13 RX ORDER — TOPIRAMATE 25 MG/1
25 TABLET ORAL
Qty: 7 TAB | Refills: 0 | Status: SHIPPED | OUTPATIENT
Start: 2018-06-13 | End: 2019-02-22

## 2018-06-13 NOTE — PROGRESS NOTES
Chief Complaint   Patient presents with    Follow-up    Labs     Fasting     she is a 37y.o. year old female who presents for evalution. Pt states previously was just having blurry vision with Qsymia but then a few days ago (Monday) blurriness increased, hard to read text. Saw Optometrist that day, was told needed to see PCP and have blood sugar checked. Pt stopped Qsymia on Monday and vision has been improving. However, knows needs to be tapered down off of medication, here today to discuss. Pt states has been having terrible cramping in legs, has started taking Magnesium Citrate starting Sunday. Pt has chronic pain in R knee. Over past month has noticed that feels some numbness in certain area when touches. Wants to make sure ok to start kickboxing. Reviewed PmHx, RxHx, FmHx, SocHx, AllgHx and updated and dated in the chart. Review of Systems - negative except as listed above in the HPI    Objective:     Vitals:    06/13/18 0721   BP: 101/67   Pulse: 95   Resp: 16   Temp: 98.5 °F (36.9 °C)   TempSrc: Oral   SpO2: 98%   Weight: 183 lb (83 kg)   Height: 5' 9\" (1.753 m)     Physical Examination: General appearance - alert, well appearing, and in no distress  Eyes - pupils equal and reactive, extraocular eye movements intact  Chest - clear to auscultation, no wheezes, rales or rhonchi, symmetric air entry  Heart - normal rate, regular rhythm, normal S1, S2, no murmurs, rubs, clicks or gallops  Musculoskeletal - R knee lateral anterior aspect has touch sensation but no tenderness   No swelling or deformity     Assessment/ Plan:   Diagnoses and all orders for this visit:    1. Vision changes  -     topiramate (TOPAMAX) 25 mg tablet; Take 1 Tab by mouth daily (with breakfast). -     HEMOGLOBIN A1C WITH EAG  New rx for 1 wk, then stop taking completely. Labs pending.      2. Muscle cramping  -     MAGNESIUM  -     METABOLIC PANEL, COMPREHENSIVE  Unclear etiology, will decide on F/U after reviewing labs. 3. Chronic pain of right knee  -     REFERRAL TO ORTHOPEDIC SURGERY  Suspect nerve entrapment. F/U with Ortho for further assessment. 4. Paresthesia of right leg  -     REFERRAL TO ORTHOPEDIC SURGERY    Pt voiced understanding regarding plan of care. Follow-up Disposition:  Return if symptoms worsen or fail to improve. I have discussed the diagnosis with the patient and the intended plan as seen in the above orders. The patient has received an after-visit summary and questions were answered concerning future plans.      Medication Side Effects and Warnings were discussed with patient    German Enriquez NP

## 2018-06-13 NOTE — PROGRESS NOTES
1. Have you been to the ER, urgent care clinic since your last visit? Hospitalized since your last visit? No    2. Have you seen or consulted any other health care providers outside of the [address] [place] [address] since your last visit? Include any pap smears or colon screening.  No     Chief Complaint   Patient presents with    Follow-up    Labs     Fasting

## 2018-06-13 NOTE — MR AVS SNAPSHOT
[address] [address] 
[phone] Patient: Sony Bethea MRN:  [id number] Visit Information Date & Time Provider Department Dept. Phone Encounter #  
 2018  7:15 AM oLlis Curran NP [address] [facility] [phone] [id number] Follow-up Instructions Return if symptoms worsen or fail to improve. Your Appointments 2018  2:20 PM  
MAMMOGRAPHY with MAMM[facility]OCELYNE OLIVERA [facility] Appt Note: mammo and ae DM  
 [address] [place] [address] [facility] [address]  
[address]  
  
    
 2018  2:40 PM  
ESTABLISHED PATIENT with MD Gil Dc ([facility]) Appt Note: mammo and ae DM  
 [address] [facility] [address] [id number]  
[address] Upcoming Health Maintenance Date Due  
 BREAST CANCER SCRN MAMMOGRAM 2018 Influenza Age 5 to Adult 2018 PAP AKA CERVICAL CYTOLOGY 8/3/2020 DTaP/Tdap/Td series (2 - Td) 2027 COLONOSCOPY 2027 Allergies as of 2018  Review Complete On: 2018 By: Lolis Curran NP Severity Noted Reaction Type Reactions Phenergan [Promethazine]  2013    Unknown (comments) Qsymia [Phentermine-topiramate]  2018    Other (comments) Vision loss Current Immunizations  Reviewed on 2016 Name Date Influenza Vaccine 10/15/2015 Influenza Vaccine Paresh Greenberg) 10/13/2017 Not reviewed this visit You Were Diagnosed With   
  
 Codes Comments Vision changes    -  Primary ICD-10-CM: H53.9 ICD-9-CM: 368.9 Muscle cramping     ICD-10-CM: R25.2 ICD-9-CM: 729.82 Chronic pain of right knee     ICD-10-CM: M25.561, G89.29 ICD-9-CM: 719.46, 338.29   
 Paresthesia of right leg     ICD-10-CM: R20.2 ICD-9-CM: 271. 0 Vitals BP Pulse Temp Resp Height(growth percentile) Weight(growth percentile) 101/67 95 98.5 °F (36.9 °C) (Oral) 16 5' 9\" (1.753 m) 183 lb (83 kg) SpO2 BMI OB Status Smoking Status 98% 27.02 kg/m2 Hysterectomy Former Smoker Vitals History BMI and BSA Data Body Mass Index Body Surface Area  
 27.02 kg/m 2 2.01 m 2 Preferred Pharmacy Pharmacy Name Phone [facility] [address] [facility] [phone] Your Updated Medication List  
  
   
This list is accurate as of 6/13/18  7:53 AM.  Always use your most recent med list.  
  
  
  
  
 busPIRone 30 mg tablet Commonly known as:  BUSPAR Take 1 Tab by mouth two (2) times a day. diclofenac EC 75 mg EC tablet Commonly known as:  VOLTAREN Take 1 Tab by mouth two (2) times daily (after meals). gabapentin 400 mg capsule Commonly known as:  NEURONTIN Take 1 Cap by mouth three (3) times daily. methylPREDNISolone 4 mg tablet Commonly known as:  Ralene Dimas Use as directed x 6 days. SUMAtriptan 20 mg/actuation nasal spray Commonly known as:  IMITREX Use 1 spray in each nostril at onset of migraine, may repeat in 2 hrs if needed  
  
 topiramate 25 mg tablet Commonly known as:  TOPAMAX Take 1 Tab by mouth daily (with breakfast). traZODone 150 mg tablet Commonly known as:  Sandra Heron Take 1 Tab by mouth nightly. vortioxetine 5 mg tablet Commonly known as:  TRINTELLIX Take 1 Tab by mouth daily. Prescriptions Sent to Pharmacy Refills  
 topiramate (TOPAMAX) 25 mg tablet 0 Sig: Take 1 Tab by mouth daily (with breakfast). Class: Normal  
 Pharmacy: [facility] [place], [address] [facility] Ph #: [phone]  Route: Oral  
  
 We Performed the Following HEMOGLOBIN A1C WITH EAG [80577 CPT(R)] MAGNESIUM [id number] CPT(R)] METABOLIC PANEL, COMPREHENSIVE [27404 CPT(R)] REFERRAL TO ORTHOPEDIC SURGERY [REF62 Custom] Follow-up Instructions Return if symptoms worsen or fail to improve. Referral Information Referral ID Referred By Referred To  
  
 [id number] Yanet Rocha [facility] [address] 1 Count includes the [facility], [address] [place] [address] Phone: [phone] Fax: [phone] Visits Status Start Date End Date 1 New Request 6/13/18 6/13/19 If your referral has a status of pending review or denied, additional information will be sent to support the outcome of this decision. Introducing [facility]! Dear Ramonita Kumari: Thank you for requesting a [employer] account. Our records indicate that you already have an active [employer] account. You can access your account anytime at [email or web address] Did you know that you can access your hospital and ER discharge instructions at any time in [employer] You can also review all of your test results from your hospital stay or ER visit. Additional Information If you have questions, please visit the Frequently Asked Questions section of the [employer] website at [email or web address] Remember, [employer] is NOT to be used for urgent needs. For medical emergencies, dial 911. Now available from your iPhone and Android! Please provide this summary of care documentation to your next provider. Your primary care clinician is listed as TOBIAS LUND. If you have any questions after today's visit, please call [phone].

## 2018-06-14 LAB
ALBUMIN SERPL-MCNC: 4.7 G/DL (ref 3.5–5.5)
ALBUMIN/GLOB SERPL: 2.4 {RATIO} (ref 1.2–2.2)
ALP SERPL-CCNC: 100 IU/L (ref 39–117)
ALT SERPL-CCNC: 20 IU/L (ref 0–32)
AST SERPL-CCNC: 15 IU/L (ref 0–40)
BILIRUB SERPL-MCNC: 0.2 MG/DL (ref 0–1.2)
BUN SERPL-MCNC: 15 MG/DL (ref 6–24)
BUN/CREAT SERPL: 14 (ref 9–23)
CALCIUM SERPL-MCNC: 9.6 MG/DL (ref 8.7–10.2)
CHLORIDE SERPL-SCNC: 104 MMOL/L (ref 96–106)
CO2 SERPL-SCNC: 22 MMOL/L (ref 20–29)
CREAT SERPL-MCNC: 1.08 MG/DL (ref 0.57–1)
EST. AVERAGE GLUCOSE BLD GHB EST-MCNC: 108 MG/DL
GFR SERPLBLD CREATININE-BSD FMLA CKD-EPI: 63 ML/MIN/1.73
GFR SERPLBLD CREATININE-BSD FMLA CKD-EPI: 73 ML/MIN/1.73
GLOBULIN SER CALC-MCNC: 2 G/DL (ref 1.5–4.5)
GLUCOSE SERPL-MCNC: 93 MG/DL (ref 65–99)
HBA1C MFR BLD: 5.4 % (ref 4.8–5.6)
MAGNESIUM SERPL-MCNC: 2.6 MG/DL (ref 1.6–2.3)
POTASSIUM SERPL-SCNC: 4.8 MMOL/L (ref 3.5–5.2)
PROT SERPL-MCNC: 6.7 G/DL (ref 6–8.5)
SODIUM SERPL-SCNC: 142 MMOL/L (ref 134–144)

## 2018-06-28 DIAGNOSIS — F41.9 ANXIETY: ICD-10-CM

## 2018-06-28 RX ORDER — VORTIOXETINE 5 MG/1
TABLET, FILM COATED ORAL
Qty: 90 TAB | Refills: 0 | Status: SHIPPED | OUTPATIENT
Start: 2018-06-28 | End: 2018-09-25 | Stop reason: SDUPTHER

## 2018-07-19 ENCOUNTER — OFFICE VISIT (OUTPATIENT)
Dept: OBGYN CLINIC | Age: 43
End: 2018-07-19

## 2018-07-19 VITALS
DIASTOLIC BLOOD PRESSURE: 87 MMHG | HEIGHT: 69 IN | HEART RATE: 81 BPM | WEIGHT: 187 LBS | BODY MASS INDEX: 27.7 KG/M2 | SYSTOLIC BLOOD PRESSURE: 107 MMHG

## 2018-07-19 DIAGNOSIS — Z01.419 ENCOUNTER FOR GYNECOLOGICAL EXAMINATION: Primary | ICD-10-CM

## 2018-07-19 DIAGNOSIS — R68.82 DECREASED LIBIDO: ICD-10-CM

## 2018-07-19 RX ORDER — TESTOSTERONE 30 MG/1.5ML
SOLUTION TOPICAL
Qty: 1 BOTTLE | Refills: 0 | Status: SHIPPED | OUTPATIENT
Start: 2018-07-19 | End: 2019-02-22

## 2018-07-19 NOTE — PROGRESS NOTES
Annual exam ages 40-58 post hysterectomy    Lord Veras is a ,  37 y.o. female [facility] No LMP recorded. Patient has had a hysterectomy. PARADISE/BSO for endometriosis. She presents for her annual checkup. Continues to have problems with decreased libido. Has been on Divigel and Ritta Cave in the past.  Has not used in several years. Would like to resume.       Previously was using Divigel and Ritta Cave. Had problems with testosterone levels being too high. Was switched to Androgel, but didn't like it, less effective. Wanted to switch back to Ritta Cave. Was given RX, but didn't fill. Also used Estring for a couple of years after stopping systemic hormone therapy. Has not used in over year. With regard to the Gardasil vaccine, she is older than the age for which it is FDA approved. Hormonal status:  She reports no perimenstrual type symptoms. She is not having vasomotor symptoms. The patient is not using any ERT. Sexual history:    She  reports that she currently engages in sexual activity and has had male partners. She reports using the following method of birth control/protection: Surgical.    Medical conditions:    Since her last annual GYN exam about one year ago on 17, she has not the following changes in her health history: none. Surgical history confirmed with patient. has a past surgical history that includes hx hysteroscopy with endometrial ablation; hx appendectomy; hx colectomy; hx colonoscopy; hx dilation and curettage; hx paradise and bso (07); and hx orthopaedic (2013). Pap and Mammogram History:    Her most recent Pap smear was normal w/negatvie HPV, obtained on 13. H/o PARADISE/BSO for endometriosis. Ok to exit routine screening. The patient had her mammogram today in our office.     Breast Cancer History/Substance Abuse: negative / negative    Osteoporosis History:    Family history does not include a first or second degree relative with osteopenia or osteoporosis. Past Medical History:   Diagnosis Date    ADHD (attention deficit hyperactivity disorder)     Ankylosing spondylitis (HCC)     Endometriosis     Fibroids     Fibromyalgia     Hx of mammogram 07/18/2017    Negative. No mammographic evidence of malignancy.  Infertility, female     Screening for malignant neoplasm of the cervix 11/25/13    Negative ; HPV Negative     Past Surgical History:   Procedure Laterality Date    HX APPENDECTOMY      HX COLECTOMY      HX COLONOSCOPY      HX DILATION AND CURETTAGE      HX HYSTEROSCOPY WITH ENDOMETRIAL ABLATION      HX ORTHOPAEDIC  jan/july 2013    left foot surgery cyst removed    HX PARADISE AND BSO  1/22/07    due to endometriosis       Current Outpatient Prescriptions   Medication Sig Dispense Refill    ketorolac (TORADOL) 10 mg tablet TAKE 1 TABLET BY MOUTH EVERY 6 HOURS AS NEEDED FOR PAIN 10 Tab 2    traZODone (DESYREL) 150 mg tablet TAKE 1 TABLET BY MOUTH EVERY NIGHT 90 Tab 2    TRINTELLIX 5 mg tablet TAKE 1 TABLET BY MOUTH DAILY 90 Tab 0    gabapentin (NEURONTIN) 400 mg capsule Take 1 Cap by mouth three (3) times daily. 360 Cap 1    busPIRone (BUSPAR) 30 mg tablet Take 1 Tab by mouth two (2) times a day. 180 Tab 1    SUMAtriptan (IMITREX) 20 mg/actuation nasal spray Use 1 spray in each nostril at onset of migraine, may repeat in 2 hrs if needed 6 Container 5    topiramate (TOPAMAX) 25 mg tablet Take 1 Tab by mouth daily (with breakfast). 7 Tab 0    methylPREDNISolone (MEDROL DOSEPACK) 4 mg tablet Use as directed x 6 days. 1 Dose Pack 0     Allergies: Phenergan [promethazine] and Qsymia [phentermine-topiramate]     Tobacco History:  reports that she quit smoking about a year ago. She has never used smokeless tobacco.  Alcohol Abuse:  reports that she does not drink alcohol. Drug Abuse:  reports that she does not use illicit drugs.     Family Medical/Cancer History:   Family History   Problem Relation Age of Onset  Alcohol abuse Other     Arthritis-rheumatoid Other     Stroke Other     Cancer Other      bladder and tongue    Stroke Maternal Grandmother         Review of Systems - History obtained from the patient  Constitutional: negative for weight loss, fever, night sweats  HEENT: negative for hearing loss, earache, congestion, snoring, sorethroat  CV: negative for chest pain, palpitations, edema  Resp: negative for cough, shortness of breath, wheezing  GI: negative for change in bowel habits, abdominal pain, black or bloody stools  : negative for frequency, dysuria, hematuria, vaginal discharge  MSK: negative for back pain, joint pain, muscle pain  Breast: negative for breast lumps, nipple discharge, galactorrhea  Skin :negative for itching, rash, hives  Neuro: negative for dizziness, headache, confusion, weakness  Psych: negative for anxiety, depression, change in mood  Heme/lymph: negative for bleeding, bruising, pallor    Physical Exam    Visit Vitals    /87    Pulse 81    Ht 5' 9\" (1.753 m)    Wt 187 lb (84.8 kg)    BMI 27.62 kg/m2     Constitutional  · Appearance: well-nourished, well developed, alert, in no acute distress    HENT  · Head and Face: appears normal    Neck  · Inspection/Palpation: normal appearance, no masses or tenderness  · Lymph Nodes: no lymphadenopathy present  · Thyroid: gland size normal, nontender, no nodules or masses present on palpation    Chest  · Respiratory Effort: breathing unlabored  · Auscultation: normal breath sounds    Cardiovascular  · Heart:  · Auscultation: regular rate and rhythm without murmur    Breasts  · Inspection of Breasts: breasts symmetrical, no skin changes, no discharge present, nipple appearance normal, no skin retraction present  · Palpation of Breasts and Axillae: no masses present on palpation, no breast tenderness  · Axillary Lymph Nodes: no lymphadenopathy present    Gastrointestinal  · Abdominal Examination: abdomen non-tender to palpation, normal bowel sounds, no masses present  · Liver and spleen: no hepatomegaly present, spleen not palpable  · Hernias: no hernias identified    Genitourinary  · External Genitalia: normal appearance for age, no discharge present, no tenderness present, no inflammatory lesions present, no masses present, no atrophy present  · Vagina: normal vaginal vault without central or paravaginal defects, no discharge present, no inflammatory lesions present, no masses present  · Bladder: non-tender to palpation  · Urethra: appears normal  · Cervix: absent  · Uterus: absent  · Adnexa: no adnexal tenderness present, no adnexal masses present  · Perineum: perineum within normal limits, no evidence of trauma, no rashes or skin lesions present  · Anus: anus within normal limits, no hemorrhoids present  · Inguinal Lymph Nodes: no lymphadenopathy present    Skin  · General Inspection: no rash, no lesions identified    Neurologic/Psychiatric  · Mental Status:  · Orientation: grossly oriented to person, place and time  · Mood and Affect: mood normal, affect appropriate    Assessment:  Routine gynecologic examination  Her current medical status is satisfactory with no evidence of significant gynecologic issues. Decreased libido  PARADISE/BSO for endometriosis    Plan:  Counseled re: diet, exercise, healthy lifestyle  Return for yearly wellness visits  Rec annual mammogram.  Done today. Will resume Divigel and Muriel Zachariah. Will draw baseline testosterone levels today. Divigel 0.1mg. Axiron - just cover bottom of well. Apply every other day. Will recheck levels in 3 months to reassess dosing. May go to every day dosing. Discussed Muriel Zachariah likely not covered, stressed importance of dosing to remain within physiologic levels. Discussed VTE risk.       Orders Placed This Encounter    TESTOSTERONE, TOTAL, FEMALE/CHILD    ALBUMIN    SEX HORMONE BINDING GLOBULIN    Estradiol (DIVIGEL) 1 mg/gram (0.1 %) glpk     Si Packet by TransDERmal route daily.     Dispense:  90 Packet     Refill:  4    testosterone (AXIRON) 30 mg/actuation (1.5 mL) slpm     Sig: Use as directed, every other day     Dispense:  1 Bottle     Refill:  0

## 2018-07-19 NOTE — PATIENT INSTRUCTIONS

## 2018-07-23 LAB
ALBUMIN SERPL-MCNC: 4.6 G/DL (ref 3.5–5.5)
SHBG SERPL-SCNC: 32.2 NMOL/L (ref 24.6–122)
TESTOST SERPL-MCNC: 12.4 NG/DL

## 2018-09-10 DIAGNOSIS — F41.9 ANXIETY: ICD-10-CM

## 2018-09-10 RX ORDER — BUSPIRONE HYDROCHLORIDE 30 MG/1
TABLET ORAL
Qty: 180 TAB | Refills: 0 | Status: SHIPPED | OUTPATIENT
Start: 2018-09-10 | End: 2019-01-20 | Stop reason: SDUPTHER

## 2018-09-21 ENCOUNTER — OFFICE VISIT (OUTPATIENT)
Dept: FAMILY MEDICINE CLINIC | Age: 43
End: 2018-09-21

## 2018-09-21 VITALS
BODY MASS INDEX: 29.03 KG/M2 | HEART RATE: 82 BPM | DIASTOLIC BLOOD PRESSURE: 71 MMHG | HEIGHT: 69 IN | OXYGEN SATURATION: 98 % | SYSTOLIC BLOOD PRESSURE: 107 MMHG | RESPIRATION RATE: 16 BRPM | TEMPERATURE: 98.1 F | WEIGHT: 196 LBS

## 2018-09-21 DIAGNOSIS — M79.7 FIBROMYALGIA: ICD-10-CM

## 2018-09-21 DIAGNOSIS — F90.9 ATTENTION DEFICIT HYPERACTIVITY DISORDER (ADHD), UNSPECIFIED ADHD TYPE: ICD-10-CM

## 2018-09-21 DIAGNOSIS — M45.9 ANKYLOSING SPONDYLITIS, UNSPECIFIED SITE OF SPINE (HCC): ICD-10-CM

## 2018-09-21 DIAGNOSIS — E83.41 HIGH MAGNESIUM LEVELS: ICD-10-CM

## 2018-09-21 DIAGNOSIS — Z00.00 ROUTINE GENERAL MEDICAL EXAMINATION AT A HEALTH CARE FACILITY: Primary | ICD-10-CM

## 2018-09-21 DIAGNOSIS — E55.9 VITAMIN D DEFICIENCY: ICD-10-CM

## 2018-09-21 RX ORDER — CYCLOBENZAPRINE HCL 5 MG
5 TABLET ORAL
Qty: 60 TAB | Refills: 5 | Status: SHIPPED | OUTPATIENT
Start: 2018-09-21 | End: 2019-03-15 | Stop reason: SDUPTHER

## 2018-09-21 RX ORDER — DEXTROAMPHETAMINE SACCHARATE, AMPHETAMINE ASPARTATE MONOHYDRATE, DEXTROAMPHETAMINE SULFATE AND AMPHETAMINE SULFATE 2.5; 2.5; 2.5; 2.5 MG/1; MG/1; MG/1; MG/1
10 CAPSULE, EXTENDED RELEASE ORAL
Qty: 30 CAP | Refills: 0 | Status: SHIPPED | OUTPATIENT
Start: 2018-09-21 | End: 2018-10-17 | Stop reason: SDUPTHER

## 2018-09-21 NOTE — PATIENT INSTRUCTIONS

## 2018-09-21 NOTE — MR AVS SNAPSHOT
[address] [address] 
[phone] Patient: Billie Berkowitz MRN:  :[id number] Visit Information Date & Time Provider Department Dept. Phone Encounter #  
 2018  9:00 AM Manolo Torress, NP [address] [facility] [phone] [id number] Follow-up Instructions Return if symptoms worsen or fail to improve. Your Appointments 10/15/2018  2:00 PM  
LAB with MD Gil Gaffney ([facility]) Appt Note: 3month lab only  DM  
 [address] [facility] [address] [place] [facility] [address]  
[facility] [address] 12[address] Upcoming Health Maintenance Date Due Influenza Age 5 to Adult 2019* BREAST CANCER SCRN MAMMOGRAM 2019 PAP AKA CERVICAL CYTOLOGY 8/3/2020 DTaP/Tdap/Td series (2 - Td) 2027 COLONOSCOPY 2027 *Topic was postponed. The date shown is not the original due date. Allergies as of 2018  Review Complete On: 2018 By: Isa Kent LPN Severity Noted Reaction Type Reactions Phenergan [Promethazine]  2013    Unknown (comments) Qsymia [Phentermine-topiramate]  2018    Other (comments) Vision loss Current Immunizations  Reviewed on 2016 Name Date Influenza Vaccine 10/15/2015 Influenza Vaccine Lavaun Sorrel) 10/13/2017 Not reviewed this visit You Were Diagnosed With   
  
 Codes Comments Routine general medical examination at a health care facility    -  Primary ICD-10-CM: Z00.00 ICD-9-CM: V70.0 Attention deficit hyperactivity disorder (ADHD), unspecified ADHD type     ICD-10-CM: F90.9 ICD-9-CM: 314.01 Vitamin D deficiency     ICD-10-CM: E55.9 ICD-9-CM: 268.9 High magnesium levels     ICD-10-CM: E83.41 
ICD-9-CM: 275.2 Ankylosing spondylitis, unspecified site of spine ([facility]     ICD-10-CM: M45.9 ICD-9-CM: 720.0 Fibromyalgia     ICD-10-CM: M79.7 ICD-9-CM: 729.1 Vitals BP Pulse Temp Resp Height(growth percentile) Weight(growth percentile) 107/71 (BP 1 Location: Right arm, BP Patient Position: Sitting) 82 98.1 °F (36.7 °C) (Oral) 16 5' 9\" (1.753 m) 196 lb (88.9 kg) SpO2 BMI OB Status Smoking Status 98% 28.94 kg/m2 Hysterectomy Former Smoker Vitals History BMI and BSA Data Body Mass Index Body Surface Area  
 28.94 kg/m 2 2.08 m 2 Preferred Pharmacy Pharmacy Name Phone [address] [place] AT [facility] OF  [facility] [phone] Your Updated Medication List  
  
   
This list is accurate as of 9/21/18  9:17 AM.  Always use your most recent med list.  
  
  
  
  
 amphetamine-dextroamphetamine XR 10 mg XR capsule Commonly known as:  ADDERALL XR Take 1 Cap (10 mg total) by mouth every morning. Max Daily Amount: 10 mg  
  
 busPIRone 30 mg tablet Commonly known as:  BUSPAR  
TAKE 1 TABLET BY MOUTH TWICE DAILY  
  
 cyclobenzaprine 5 mg tablet Commonly known as:  FLEXERIL Take 1 Tab by mouth three (3) times daily as needed for Muscle Spasm(s). Estradiol 1 mg/gram (0.1 %) Glpk Commonly known as:  DIVIGEL  
1 Packet by TransDERmal route daily. gabapentin 400 mg capsule Commonly known as:  NEURONTIN Take 1 Cap by mouth three (3) times daily. ketorolac 10 mg tablet Commonly known as:  TORADOL TAKE 1 TABLET BY MOUTH EVERY 6 HOURS AS NEEDED FOR PAIN  
  
 methylPREDNISolone 4 mg tablet Commonly known as:  Merna Cambric Use as directed x 6 days. SUMAtriptan 20 mg/actuation nasal spray Commonly known as:  IMITREX Use 1 spray in each nostril at onset of migraine, may repeat in 2 hrs if needed  
  
 testosterone 30 mg/actuation (1.5 mL) Slpm  
Commonly known as:  Gilbert Welch Use as directed, every other day  
  
 topiramate 25 mg tablet Commonly known as:  TOPAMAX Take 1 Tab by mouth daily (with breakfast). traZODone 150 mg tablet Commonly known as:  DESYREL  
TAKE 1 TABLET BY MOUTH EVERY NIGHT  
  
 TRINTELLIX 5 mg tablet Generic drug:  vortioxetine TAKE 1 TABLET BY MOUTH DAILY Prescriptions Printed Refills  
 amphetamine-dextroamphetamine XR (ADDERALL XR) 10 mg XR capsule 0 Sig: Take 1 Cap (10 mg total) by mouth every morning. Max Daily Amount: 10 mg  
 Class: Print Route: Oral  
  
Prescriptions Sent to Pharmacy Refills  
 cyclobenzaprine (FLEXERIL) 5 mg tablet 5 Sig: Take 1 Tab by mouth three (3) times daily as needed for Muscle Spasm(s). Class: Normal  
 Pharmacy: [facility] [place], [address] [facility] #: [phone] Route: Oral  
  
We Performed the Following CBC WITH AUTOMATED DIFF [63849 CPT(R)] LIPID PANEL [91446 CPT(R)] MAGNESIUM [id number] CPT(R)] METABOLIC PANEL, COMPREHENSIVE [57107 CPT(R)] TSH 3RD GENERATION [11313 CPT(R)] VITAMIN D, 25 HYDROXY [id number] CPT(R)] Follow-up Instructions Return if symptoms worsen or fail to improve. Patient Instructions Well Visit, Ages 25 to 48: Care Instructions Your Care Instructions Physical exams can help you stay healthy. Your doctor has checked your overall health and may have suggested ways to take good care of yourself. He or she also may have recommended tests. At home, you can help prevent illness with healthy eating, regular exercise, and other steps. Follow-up care is a key part of your treatment and safety. Be sure to make and go to all appointments, and call your doctor if you are having problems. It's also a good idea to know your test results and keep a list of the medicines you take. How can you care for yourself at home? · Reach and stay at a healthy weight.  This will lower your risk for many problems, such as obesity, diabetes, heart disease, and high blood pressure. · Get at least 30 minutes of physical activity on most days of the week. Walking is a good choice. You also may want to do other activities, such as running, swimming, cycling, or playing tennis or team sports. Discuss any changes in your exercise program with your doctor. · Do not smoke or allow others to smoke around you. If you need help quitting, talk to your doctor about stop-smoking programs and medicines. These can increase your chances of quitting for good. · Talk to your doctor about whether you have any risk factors for sexually transmitted infections (STIs). Having one sex partner (who does not have STIs and does not have sex with anyone else) is a good way to avoid these infections. · Use birth control if you do not want to have children at this time. Talk with your doctor about the choices available and what might be best for you. · Protect your skin from too much sun. When you're outdoors from 10 a.m. to 4 p.m., stay in the shade or cover up with clothing and a hat with a wide brim. Wear sunglasses that block UV rays. Even when it's cloudy, put broad-spectrum sunscreen (SPF 30 or higher) on any exposed skin. · See a dentist one or two times a year for checkups and to have your teeth cleaned. · Wear a seat belt in the car. · Drink alcohol in moderation, if at all. That means no more than 2 drinks a day for men and 1 drink a day for women. Follow your doctor's advice about when to have certain tests. These tests can spot problems early. For everyone · Cholesterol. Have the fat (cholesterol) in your blood tested after age 21. Your doctor will tell you how often to have this done based on your age, family history, or other things that can increase your risk for heart disease. · Blood pressure.  Have your blood pressure checked during a routine doctor visit. Your doctor will tell you how often to check your blood pressure based on your age, your blood pressure results, and other factors. · Vision. Talk with your doctor about how often to have a glaucoma test. 
· Diabetes. Ask your doctor whether you should have tests for diabetes. · Colon cancer. Have a test for colon cancer at age 48. You may have one of several tests. If you are younger than 48, you may need a test earlier if you have any risk factors. Risk factors include whether you already had a precancerous polyp removed from your colon or whether your parent, brother, sister, or child has had colon cancer. For women · Breast exam and mammogram. Talk to your doctor about when you should have a clinical breast exam and a mammogram. Medical experts differ on whether and how often women under 50 should have these tests. Your doctor can help you decide what is right for you. · Pap test and pelvic exam. Begin Pap tests at age 24. A Pap test is the best way to find cervical cancer. The test often is part of a pelvic exam. Ask how often to have this test. 
· Tests for sexually transmitted infections (STIs). Ask whether you should have tests for STIs. You may be at risk if you have sex with more than one person, especially if your partners do not wear condoms. For men · Tests for sexually transmitted infections (STIs). Ask whether you should have tests for STIs. You may be at risk if you have sex with more than one person, especially if you do not wear a condom. · Testicular cancer exam. Ask your doctor whether you should check your testicles regularly. · Prostate exam. Talk to your doctor about whether you should have a blood test (called a PSA test) for prostate cancer. Experts differ on whether and when men should have this test. Some experts suggest it if you are older than 39 and are -American or have a father or brother who got prostate cancer when he was younger than 72. When should you call for help? Watch closely for changes in your health, and be sure to contact your doctor if you have any problems or symptoms that concern you. Where can you learn more? Go to http://curtis-harriett.info/. Enter P072 in the search box to learn more about \"Well Visit, Ages 25 to 48: Care Instructions. \" Current as of: May 16, 2017 Content Version: 11.7 © 3095-9156 [employer]. Care instructions adapted under license by [employer] (which disclaims liability or warranty for this information). If you have questions about a medical condition or this instruction, always ask your healthcare professional. [address] any warranty or liability for your use of this information. Introducing [facility]! Dear Chau Pereira: Thank you for requesting a Zoop account. Our records indicate that you already have an active Zoop account. You can access your account anytime at [email or web address]. [employer]/"[employer] Did you know that you can access your hospital and ER discharge instructions at any time in Zoop? You can also review all of your test results from your hospital stay or ER visit. Additional Information If you have questions, please visit the Frequently Asked Questions section of the Zoop website at [email or web address]. [employer]/. Remember, Zoop is NOT to be used for urgent needs. For medical emergencies, dial 911. Now available from your iPhone and Android! Please provide this summary of care documentation to your next provider. Your primary care clinician is listed as TOBIAS LUND. If you have any questions after today's visit, please call [phone].

## 2018-09-21 NOTE — PROGRESS NOTES
Pt here for routine physical w/o PAP. Pt is fasting this AM for lab work. Would like to discuss getting a RX for Flexeril. Also would like to discuss weight management.

## 2018-09-21 NOTE — PROGRESS NOTES
Sohpia Gonsales is a 37 y.o. female presenting for their annual checkup. Follows a low fat diet?  no.  Dietary recall: 3 meals a day at least, has been having increased appetite since stopping Qsymia - pt very unhappy and has been gaining weight back, some fruits and vegetables, drinks mostly diet dr pepper (6 cans a day) and some flavored water Follow an exercise program?  no 
Hours of sleep? 8 hrs Changes in bowel or bladder habits?  no 
Up to date on Tdap (<10 years)? Yes, done elsewhere Feels stable and well emotionally? Yes Current concerns include:  Pt states ADD has been worsening. Has been off medication for past 2 years. Requesting to restart, was previously on Adderall for many years and was well controlled. Pt requesting refill on rx for Flexeril, typically gets through [facility] but takes a lot of time for refills - pt has been out since Monday. Past Medical History:  
Diagnosis Date  ADHD (attention deficit hyperactivity disorder)  Ankylosing spondylitis ([facility] [facility]  Endometriosis  Fibroids  Fibromyalgia  Hx of mammogram 07/19/2018 Negative. No mammographic evidence of malignancy.  Infertility, female  Screening for malignant neoplasm of the cervix 11/25/13 Negative ; HPV Negative Past Surgical History:  
Procedure Laterality Date  HX APPENDECTOMY  HX COLECTOMY  HX COLONOSCOPY    
 HX DILATION AND CURETTAGE    
 HX HYSTEROSCOPY WITH ENDOMETRIAL ABLATION    
 HX ORTHOPAEDIC  jan/july 2013  
 left foot surgery cyst removed  HX [facility]ADISE AND BSO  1/22/07  
 due to endometriosis Prior to Admission medications Medication Sig Start Date End Date Taking? Authorizing Provider  
amphetamine-dextroamphetamine XR (ADDERALL XR) 10 mg XR capsule Take 1 Cap (10 mg total) by mouth every morning.   Max Daily Amount: 10 mg 9/21/18  Yes Jhoana Head NP  
cyclobenzaprine (FLEXERIL) 5 mg tablet Take 1 Tab by mouth three (3) times daily as needed for Muscle Spasm(s). 9/21/18  Yes Mecca Sosa NP  
busPIRone (BUSPAR) 30 mg tablet TAKE 1 TABLET BY MOUTH TWICE DAILY 9/10/18  Yes Mecca Sosa NP  
ketorolac (TORADOL) 10 mg tablet TAKE 1 TABLET BY MOUTH EVERY 6 HOURS AS NEEDED FOR PAIN 7/18/18  Yes Mecca Sosa NP  
traZODone (DESYREL) 150 mg tablet TAKE 1 TABLET BY MOUTH EVERY NIGHT 7/18/18  Yes Mecca Sosa NP  
TRINTELLIX 5 mg tablet TAKE 1 TABLET BY MOUTH DAILY 6/28/18  Yes Soila Weiss MD  
gabapentin (NEURONTIN) 400 mg capsule Take 1 Cap by mouth three (3) times daily. 12/4/17  Yes Mecca Sosa NP  
SUMAtriptan (IMITREX) 20 mg/actuation nasal spray Use 1 spray in each nostril at onset of migraine, may repeat in 2 hrs if needed 5/24/17  Yes Mecca Sosa NP Estradiol (DIVIGEL) 1 mg/gram (0.1 %) glpk 1 Packet by TransDERmal route daily. 7/19/18   Jeremy Armendariz MD  
testosterone (AXIRON) 30 mg/actuation (1.5 mL) slpm Use as directed, every other day 7/19/18   Jeremy Armendariz MD  
topiramate (TOPAMAX) 25 mg tablet Take 1 Tab by mouth daily (with breakfast). 6/13/18   Mecca Sosa NP  
methylPREDNISolone (MEDROL DOSEPACK) 4 mg tablet Use as directed x 6 days. 5/30/18   Mecca Sosa NP Allergies Allergen Reactions  Phenergan [Promethazine] Unknown (comments)  Qsymia [Phentermine-Topiramate] Other (comments) Vision loss Social History Substance Use Topics  Smoking status: Former Smoker Quit date: 7/14/2017  Smokeless tobacco: Never Used Comment: Never used vapor or e-cigs  Alcohol use No  
  
Family History Problem Relation Age of Onset  Alcohol abuse Other  Arthritis-rheumatoid Other  Stroke Other  Cancer Other   
  bladder and tongue  Stroke Maternal Grandmother Review of Systems - Psychological ROS: negative Ophthalmic ROS: negative ENT ROS: negative Endocrine ROS: negative Breast ROS: negative Respiratory ROS: no cough, shortness of breath, or wheezing Cardiovascular ROS: no chest pain or dyspnea on exertion Gastrointestinal ROS: no abdominal pain, change in bowel habits, or black or bloody stools Genito-Urinary ROS: no dysuria, trouble voiding, or hematuria Musculoskeletal ROS: negative Neurological ROS: no TIA or stroke symptoms Dermatological ROS: negative Objective: 
Visit Vitals  /71 (BP 1 Location: Right arm, BP Patient Position: Sitting)  Pulse 82  Temp 98.1 °F (36.7 °C) (Oral)  Resp 16  
 Ht 5' 9\" (1.753 m)  Wt 196 lb (88.9 kg)  SpO2 98%  BMI 28.94 kg/m2 The physical exam is generally normal. Patient appears well, alert and oriented x 3, pleasant, cooperative. Vitals are as noted. Neck supple and free of adenopathy, or masses. No thyromegaly. NAN. Ears, throat are normal. Lungs are clear to auscultation. Heart sounds are normal, no murmurs, clicks, gallops or rubs. Abdomen is soft, no tenderness, masses or organomegaly. Breasts: patient declines to have breast exam. Self exam is encouraged. Pelvis: exam declined by the patient. Extremities are normal. Peripheral pulses are normal. Screening neurological exam is normal without focal findings. Skin is normal without suspicious lesions noted. Assessment/Plan: 
Diagnoses and all orders for this visit: 1. Routine general medical examination at a health care facility 
-     CBC WITH AUTOMATED DIFF 
-     METABOLIC PANEL, COMPREHENSIVE 
-     LIPID PANEL 
-     TSH 3RD GENERATION 2. Attention deficit hyperactivity disorder (ADHD), unspecified ADHD type 
-     amphetamine-dextroamphetamine XR (ADDERALL XR) 10 mg XR capsule; Take 1 Cap (10 mg total) by mouth every morning. Max Daily Amount: 10 mg New rx, request prior records. 3. Vitamin D deficiency 
-     VITAMIN D, 25 HYDROXY 4. High magnesium levels -     MAGNESIUM 5. Ankylosing spondylitis, unspecified site of spine ([facility] -     cyclobenzaprine (FLEXERIL) 5 mg tablet; Take 1 Tab by mouth three (3) times daily as needed for Muscle Spasm(s). Stable, refills provided. 6. Fibromyalgia -     cyclobenzaprine (FLEXERIL) 5 mg tablet; Take 1 Tab by mouth three (3) times daily as needed for Muscle Spasm(s). Discussed importance of healthy diet and regular exercise, recommended multivitamin and sunscreen usage. Encouraged monthly self breast/testicular exam.   
 
Follow-up Disposition: 
Return if symptoms worsen or fail to improve.  
 
Jethro Angulo, MARKIE

## 2018-09-22 LAB
25(OH)D3+25(OH)D2 SERPL-MCNC: 24.4 NG/ML (ref 30–100)
ALBUMIN SERPL-MCNC: 4.7 G/DL (ref 3.5–5.5)
ALBUMIN/GLOB SERPL: 2.1 {RATIO} (ref 1.2–2.2)
ALP SERPL-CCNC: 94 IU/L (ref 39–117)
ALT SERPL-CCNC: 42 IU/L (ref 0–32)
AST SERPL-CCNC: 27 IU/L (ref 0–40)
BASOPHILS # BLD AUTO: 0 X10E3/UL (ref 0–0.2)
BASOPHILS NFR BLD AUTO: 0 %
BILIRUB SERPL-MCNC: <0.2 MG/DL (ref 0–1.2)
BUN SERPL-MCNC: 13 MG/DL (ref 6–24)
BUN/CREAT SERPL: 15 (ref 9–23)
CALCIUM SERPL-MCNC: 9.7 MG/DL (ref 8.7–10.2)
CHLORIDE SERPL-SCNC: 102 MMOL/L (ref 96–106)
CHOLEST SERPL-MCNC: 260 MG/DL (ref 100–199)
CO2 SERPL-SCNC: 26 MMOL/L (ref 20–29)
CREAT SERPL-MCNC: 0.88 MG/DL (ref 0.57–1)
EOSINOPHIL # BLD AUTO: 0.1 X10E3/UL (ref 0–0.4)
EOSINOPHIL NFR BLD AUTO: 2 %
ERYTHROCYTE [DISTWIDTH] IN BLOOD BY AUTOMATED COUNT: 13.7 % (ref 12.3–15.4)
GLOBULIN SER CALC-MCNC: 2.2 G/DL (ref 1.5–4.5)
GLUCOSE SERPL-MCNC: 91 MG/DL (ref 65–99)
HCT VFR BLD AUTO: 39.8 % (ref 34–46.6)
HDLC SERPL-MCNC: 63 MG/DL
HGB BLD-MCNC: 13.2 G/DL (ref 11.1–15.9)
IMM GRANULOCYTES # BLD: 0 X10E3/UL (ref 0–0.1)
IMM GRANULOCYTES NFR BLD: 0 %
INTERPRETATION, 910389: NORMAL
LDLC SERPL CALC-MCNC: 163 MG/DL (ref 0–99)
LYMPHOCYTES # BLD AUTO: 2.2 X10E3/UL (ref 0.7–3.1)
LYMPHOCYTES NFR BLD AUTO: 41 %
MAGNESIUM SERPL-MCNC: 2.3 MG/DL (ref 1.6–2.3)
MCH RBC QN AUTO: 30.3 PG (ref 26.6–33)
MCHC RBC AUTO-ENTMCNC: 33.2 G/DL (ref 31.5–35.7)
MCV RBC AUTO: 92 FL (ref 79–97)
MONOCYTES # BLD AUTO: 0.5 X10E3/UL (ref 0.1–0.9)
MONOCYTES NFR BLD AUTO: 9 %
NEUTROPHILS # BLD AUTO: 2.6 X10E3/UL (ref 1.4–7)
NEUTROPHILS NFR BLD AUTO: 48 %
PLATELET # BLD AUTO: 255 X10E3/UL (ref 150–379)
POTASSIUM SERPL-SCNC: 5 MMOL/L (ref 3.5–5.2)
PROT SERPL-MCNC: 6.9 G/DL (ref 6–8.5)
RBC # BLD AUTO: 4.35 X10E6/UL (ref 3.77–5.28)
SODIUM SERPL-SCNC: 143 MMOL/L (ref 134–144)
TRIGL SERPL-MCNC: 172 MG/DL (ref 0–149)
TSH SERPL DL<=0.005 MIU/L-ACNC: 0.99 UIU/ML (ref 0.45–4.5)
VLDLC SERPL CALC-MCNC: 34 MG/DL (ref 5–40)
WBC # BLD AUTO: 5.3 X10E3/UL (ref 3.4–10.8)

## 2018-09-24 RX ORDER — ERGOCALCIFEROL 1.25 MG/1
50000 CAPSULE ORAL
Qty: 4 CAP | Refills: 2 | Status: SHIPPED | OUTPATIENT
Start: 2018-09-24 | End: 2019-02-22

## 2018-09-24 NOTE — PROGRESS NOTES
Cholesterol has increased - work on diet and exercise, recheck 3 mo Slightly elevated ALT - cont to monitor Vit D slightly low - will send in rx

## 2018-09-25 DIAGNOSIS — F41.9 ANXIETY: ICD-10-CM

## 2018-09-25 RX ORDER — VORTIOXETINE 5 MG/1
TABLET, FILM COATED ORAL
Qty: 90 TAB | Refills: 0 | Status: SHIPPED | OUTPATIENT
Start: 2018-09-25 | End: 2019-02-22

## 2018-10-15 ENCOUNTER — DOCUMENTATION ONLY (OUTPATIENT)
Dept: FAMILY MEDICINE CLINIC | Age: 43
End: 2018-10-15

## 2018-10-15 ENCOUNTER — LAB ONLY (OUTPATIENT)
Dept: OBGYN CLINIC | Age: 43
End: 2018-10-15

## 2018-10-15 DIAGNOSIS — R68.82 DECREASED LIBIDO: Primary | ICD-10-CM

## 2018-10-15 DIAGNOSIS — R89.9 ABNORMAL LABORATORY TEST: ICD-10-CM

## 2018-10-15 NOTE — PROGRESS NOTES
[facility] records rec'd and put on MARKIE Coleman's desk to review and sign for scanning

## 2018-10-17 DIAGNOSIS — F90.9 ATTENTION DEFICIT HYPERACTIVITY DISORDER (ADHD), UNSPECIFIED ADHD TYPE: ICD-10-CM

## 2018-10-17 RX ORDER — DEXTROAMPHETAMINE SACCHARATE, AMPHETAMINE ASPARTATE MONOHYDRATE, DEXTROAMPHETAMINE SULFATE AND AMPHETAMINE SULFATE 2.5; 2.5; 2.5; 2.5 MG/1; MG/1; MG/1; MG/1
10 CAPSULE, EXTENDED RELEASE ORAL
Qty: 30 CAP | Refills: 0 | Status: SHIPPED | OUTPATIENT
Start: 2018-10-20 | End: 2019-02-22

## 2018-10-17 RX ORDER — DEXTROAMPHETAMINE SACCHARATE, AMPHETAMINE ASPARTATE MONOHYDRATE, DEXTROAMPHETAMINE SULFATE AND AMPHETAMINE SULFATE 2.5; 2.5; 2.5; 2.5 MG/1; MG/1; MG/1; MG/1
10 CAPSULE, EXTENDED RELEASE ORAL
Qty: 30 CAP | Refills: 0 | Status: SHIPPED | OUTPATIENT
Start: 2018-11-19 | End: 2019-02-22

## 2018-10-18 LAB
ALBUMIN SERPL-MCNC: 4.9 G/DL (ref 3.5–5.5)
ESTRADIOL SERPL-MCNC: <5 PG/ML
SHBG SERPL-SCNC: 27.6 NMOL/L (ref 24.6–122)
TESTOST SERPL-MCNC: 12.5 NG/DL

## 2018-12-17 DIAGNOSIS — G43.909 MIGRAINE WITHOUT STATUS MIGRAINOSUS, NOT INTRACTABLE, UNSPECIFIED MIGRAINE TYPE: ICD-10-CM

## 2018-12-19 RX ORDER — KETOROLAC TROMETHAMINE 10 MG/1
TABLET, FILM COATED ORAL
Qty: 10 TAB | Refills: 0 | Status: SHIPPED | OUTPATIENT
Start: 2018-12-19 | End: 2019-01-18 | Stop reason: SDUPTHER

## 2019-01-18 DIAGNOSIS — G43.909 MIGRAINE WITHOUT STATUS MIGRAINOSUS, NOT INTRACTABLE, UNSPECIFIED MIGRAINE TYPE: ICD-10-CM

## 2019-01-18 RX ORDER — KETOROLAC TROMETHAMINE 10 MG/1
TABLET, FILM COATED ORAL
Qty: 10 TAB | Refills: 0 | Status: SHIPPED | OUTPATIENT
Start: 2019-01-18 | End: 2019-03-08 | Stop reason: SDUPTHER

## 2019-02-11 DIAGNOSIS — M25.561 ACUTE PAIN OF RIGHT KNEE: ICD-10-CM

## 2019-02-13 RX ORDER — DICLOFENAC SODIUM 75 MG/1
TABLET, DELAYED RELEASE ORAL
Qty: 30 TAB | Refills: 0 | Status: SHIPPED | OUTPATIENT
Start: 2019-02-13 | End: 2019-06-03 | Stop reason: ALTCHOICE

## 2019-02-22 ENCOUNTER — OFFICE VISIT (OUTPATIENT)
Dept: FAMILY MEDICINE CLINIC | Age: 44
End: 2019-02-22

## 2019-02-22 VITALS
DIASTOLIC BLOOD PRESSURE: 60 MMHG | WEIGHT: 212 LBS | HEART RATE: 101 BPM | HEIGHT: 69 IN | OXYGEN SATURATION: 100 % | SYSTOLIC BLOOD PRESSURE: 110 MMHG | TEMPERATURE: 98.7 F | BODY MASS INDEX: 31.4 KG/M2 | RESPIRATION RATE: 20 BRPM

## 2019-02-22 DIAGNOSIS — E66.9 OBESITY (BMI 30.0-34.9): Primary | ICD-10-CM

## 2019-02-22 NOTE — PROGRESS NOTES
Chief Complaint   Patient presents with    Weight Gain    Medication Evaluation     discuss Belviq or Contrave

## 2019-02-22 NOTE — PATIENT INSTRUCTIONS
Starting a Weight Loss Plan: Care Instructions  Your Care Instructions    If you are thinking about losing weight, it can be hard to know where to start. Your doctor can help you set up a weight loss plan that best meets your needs. You may want to take a class on nutrition or exercise, or join a weight loss support group. If you have questions about how to make changes to your eating or exercise habits, ask your doctor about seeing a registered dietitian or an exercise specialist.  It can be a big challenge to lose weight. But you do not have to make huge changes at once. Make small changes, and stick with them. When those changes become habit, add a few more changes. If you do not think you are ready to make changes right now, try to pick a date in the future. Make an appointment to see your doctor to discuss whether the time is right for you to start a plan. Follow-up care is a key part of your treatment and safety. Be sure to make and go to all appointments, and call your doctor if you are having problems. It's also a good idea to know your test results and keep a list of the medicines you take. How can you care for yourself at home? · Set realistic goals. Many people expect to lose much more weight than is likely. A weight loss of 5% to 10% of your body weight may be enough to improve your health. · Get family and friends involved to provide support. Talk to them about why you are trying to lose weight, and ask them to help. They can help by participating in exercise and having meals with you, even if they may be eating something different. · Find what works best for you. If you do not have time or do not like to cook, a program that offers meal replacement bars or shakes may be better for you. Or if you like to prepare meals, finding a plan that includes daily menus and recipes may be best.  · Ask your doctor about other health professionals who can help you achieve your weight loss goals. ?  A dietitian can help you make healthy changes in your diet. ? An exercise specialist or  can help you develop a safe and effective exercise program.  ? A counselor or psychiatrist can help you cope with issues such as depression, anxiety, or family problems that can make it hard to focus on weight loss. · Consider joining a support group for people who are trying to lose weight. Your doctor can suggest groups in your area. Where can you learn more? Go to http://curtis-harriett.info/. Enter Z587 in the search box to learn more about \"Starting a Weight Loss Plan: Care Instructions. \"  Current as of: June 25, 2018  Content Version: 11.9  © 2880-4348 [employer]. Care instructions adapted under license by [employer] (which disclaims liability or warranty for this information). If you have questions about a medical condition or this instruction, always ask your healthcare professional. [address] any warranty or liability for your use of this information.

## 2019-02-22 NOTE — PROGRESS NOTES
Chief Complaint   Patient presents with    Weight Gain    Medication Evaluation     discuss Belviq or Contrave     she is a 37y.o. year old female who presents for evalution. Pt states has new job, feeling much less stressed and better. Pt states had been gaining a lot of weight due to stress at previous job. Now eating a lot healthier, tons of fruits and vegetables. Currently walking a lot at work, no extra exercise. Drinks mostly water with flavor additive, also 2 diet dr. Ariana De Leon. Is interested in trying another weight loss medication, previously had side effects with Qsymia. Reviewed PmHx, RxHx, FmHx, SocHx, AllgHx and updated and dated in the chart. Review of Systems - negative except as listed above in the HPI    Objective:     Vitals:    02/22/19 0724   BP: 110/60   Pulse: (!) 101   Resp: 20   Temp: 98.7 °F (37.1 °C)   SpO2: 100%   Weight: 212 lb (96.2 kg)   Height: 5' 9\" (1.753 m)     Physical Examination: General appearance - alert, well appearing, and in no distress  Chest - clear to auscultation, no wheezes, rales or rhonchi, symmetric air entry  Heart - normal rate, regular rhythm, normal S1, S2, no murmurs, rubs, clicks or gallops    Assessment/ Plan:   Diagnoses and all orders for this visit:    1. Obesity (BMI 30.0-34.9)  -     naltrexone-buPROPion (CONTRAVE) 8-90 mg TbER ER tablet; Week 1 1 tab PO QAM, Week 2 1QAM 1QHS, Week 3 2QAM 1 QHS, Week 4 & beyond 2QAM 2QHS  New rx. F/U 3 months for weight check, if medication working should have lost 10 lbs at least.       Pt voiced understanding regarding plan of care. Follow-up Disposition:  Return in about 3 months (around 5/22/2019) for weight recheck . I have discussed the diagnosis with the patient and the intended plan as seen in the above orders. The patient has received an after-visit summary and questions were answered concerning future plans.      Medication Side Effects and Warnings were discussed with patient    Chana Gonzalez, NP

## 2019-03-15 DIAGNOSIS — M45.9 ANKYLOSING SPONDYLITIS, UNSPECIFIED SITE OF SPINE (HCC): ICD-10-CM

## 2019-03-15 DIAGNOSIS — M79.7 FIBROMYALGIA: ICD-10-CM

## 2019-03-20 RX ORDER — CYCLOBENZAPRINE HCL 5 MG
TABLET ORAL
Qty: 60 TAB | Refills: 0 | Status: SHIPPED | OUTPATIENT
Start: 2019-03-20 | End: 2019-04-25 | Stop reason: SDUPTHER

## 2019-03-28 DIAGNOSIS — E66.9 OBESITY (BMI 30.0-34.9): ICD-10-CM

## 2019-04-04 ENCOUNTER — OFFICE VISIT (OUTPATIENT)
Dept: FAMILY MEDICINE CLINIC | Age: 44
End: 2019-04-04

## 2019-04-04 VITALS
HEART RATE: 91 BPM | TEMPERATURE: 98.1 F | RESPIRATION RATE: 18 BRPM | OXYGEN SATURATION: 100 % | DIASTOLIC BLOOD PRESSURE: 73 MMHG | WEIGHT: 211.9 LBS | BODY MASS INDEX: 31.39 KG/M2 | HEIGHT: 69 IN | SYSTOLIC BLOOD PRESSURE: 108 MMHG

## 2019-04-04 DIAGNOSIS — E66.9 OBESITY (BMI 30.0-34.9): Primary | ICD-10-CM

## 2019-04-04 NOTE — PROGRESS NOTES
Chief Complaint   Patient presents with    Medication Refill     she is a 37y.o. year old female who presents for evalution. Pt has been taking Contrave with no weight loss and increased craving for sweets. Has also not lost any weight. Would like to discuss other rx options. Was previously on Qsymia with good results but caused blurred vision and had to stop. Reviewed PmHx, RxHx, FmHx, SocHx, AllgHx and updated and dated in the chart. Review of Systems - negative except as listed above in the HPI    Objective:     Vitals:    04/04/19 1456   BP: 108/73   Pulse: 91   Resp: 18   Temp: 98.1 °F (36.7 °C)   TempSrc: Oral   SpO2: 100%   Weight: 211 lb 14.4 oz (96.1 kg)   Height: 5' 9\" (1.753 m)     Physical Examination: General appearance - alert, well appearing, and in no distress  Chest - clear to auscultation, no wheezes, rales or rhonchi, symmetric air entry  Heart - normal rate, regular rhythm, normal S1, S2, no murmurs, rubs, clicks or gallops    Assessment/ Plan:   Diagnoses and all orders for this visit:    1. Obesity (BMI 30.0-34.9)  Offered Belviq but pt checked her insurance plan and does not appear covered - cannot afford cash price. Will call insurance company to see what weight loss drugs they will cover and let me know, happy to call in rx for pt. Pt voiced understanding regarding plan of care. Follow-up and Dispositions    · Return if symptoms worsen or fail to improve. I have discussed the diagnosis with the patient and the intended plan as seen in the above orders. The patient has received an after-visit summary and questions were answered concerning future plans.      Medication Side Effects and Warnings were discussed with patient    Cody Ya NP

## 2019-04-04 NOTE — PROGRESS NOTES
Chief Complaint   Patient presents with    Medication Refill     Visit Vitals  /73 (BP 1 Location: Left arm, BP Patient Position: Sitting)   Pulse 91   Temp 98.1 °F (36.7 °C) (Oral)   Resp 18   Ht 5' 9\" (1.753 m)   Wt 211 lb 14.4 oz (96.1 kg)   SpO2 100%   BMI 31.29 kg/m²     1. Have you been to the ER, urgent care clinic since your last visit? Hospitalized since your last visit? No    2. Have you seen or consulted any other health care providers outside of the [address] [place] [address] since your last visit? Include any pap smears or colon screening.  No

## 2019-04-05 ENCOUNTER — PATIENT MESSAGE (OUTPATIENT)
Dept: FAMILY MEDICINE CLINIC | Age: 44
End: 2019-04-05

## 2019-04-05 DIAGNOSIS — E66.9 OBESITY (BMI 30-39.9): Primary | ICD-10-CM

## 2019-04-05 NOTE — TELEPHONE ENCOUNTER
From: Lord Veras  To: Arnold Aguirre., NP  Sent: 4/5/2019 8:28 AM EDT  Subject: Prescription Question    Morning! Just talked to [place] and supposedly, because of my BMI, they will cover Belviq under my medical benefits vs pharmacy. She said to call [phone] for authorization. I'm not confident in the person I spoke with. However, I did read something along those lines online. Let me know if there is anything I can do to help.     Nikko Pool

## 2019-04-18 DIAGNOSIS — E66.9 OBESITY (BMI 30-39.9): ICD-10-CM

## 2019-04-24 ENCOUNTER — PATIENT MESSAGE (OUTPATIENT)
Dept: FAMILY MEDICINE CLINIC | Age: 44
End: 2019-04-24

## 2019-04-24 DIAGNOSIS — M79.7 FIBROMYALGIA: ICD-10-CM

## 2019-04-24 DIAGNOSIS — E66.9 OBESITY (BMI 30-39.9): ICD-10-CM

## 2019-04-24 DIAGNOSIS — M45.9 ANKYLOSING SPONDYLITIS, UNSPECIFIED SITE OF SPINE (HCC): ICD-10-CM

## 2019-04-25 RX ORDER — CYCLOBENZAPRINE HCL 5 MG
TABLET ORAL
Qty: 60 TAB | Refills: 0 | Status: SHIPPED | OUTPATIENT
Start: 2019-04-25 | End: 2019-05-27 | Stop reason: SDUPTHER

## 2019-04-25 NOTE — TELEPHONE ENCOUNTER
From: Lance Anand  To: Shadia Domínguez., MARKIE  Sent: 4/24/2019 8:58 PM EDT  Subject: Prescription Question    Good afternoon,     Can I please get my initial prescription for Belviq and a refill on my cyclobenzaprine? Just following up.      Thank you,  July Aleman

## 2019-04-26 NOTE — TELEPHONE ENCOUNTER
Regarding: Prescription Question  Contact: [phone]  ----- Message from Ole Padron NP sent at 4/25/2019  8:28 AM EDT -----       ----- Message from General Morley to Mick Salinas NP sent at 4/24/2019  8:58 PM -----   Good afternoon,     Can I please get my initial prescription for Belviq and a refill on my cyclobenzaprine? Just following up.       Thank you,  Prince Mccurdy

## 2019-05-27 DIAGNOSIS — M79.7 FIBROMYALGIA: ICD-10-CM

## 2019-05-27 DIAGNOSIS — M45.9 ANKYLOSING SPONDYLITIS, UNSPECIFIED SITE OF SPINE (HCC): ICD-10-CM

## 2019-05-28 RX ORDER — CYCLOBENZAPRINE HCL 5 MG
TABLET ORAL
Qty: 60 TAB | Refills: 0 | Status: SHIPPED | OUTPATIENT
Start: 2019-05-28 | End: 2019-06-27 | Stop reason: SDUPTHER

## 2019-06-03 ENCOUNTER — OFFICE VISIT (OUTPATIENT)
Dept: FAMILY MEDICINE CLINIC | Age: 44
End: 2019-06-03

## 2019-06-03 VITALS
BODY MASS INDEX: 31.55 KG/M2 | RESPIRATION RATE: 16 BRPM | WEIGHT: 213 LBS | DIASTOLIC BLOOD PRESSURE: 80 MMHG | SYSTOLIC BLOOD PRESSURE: 110 MMHG | HEIGHT: 69 IN | HEART RATE: 100 BPM | TEMPERATURE: 98.4 F | OXYGEN SATURATION: 99 %

## 2019-06-03 DIAGNOSIS — S61.411A HAND LACERATION INVOLVING TENDON, RIGHT, INITIAL ENCOUNTER: Primary | ICD-10-CM

## 2019-06-03 DIAGNOSIS — S66.921A HAND LACERATION INVOLVING TENDON, RIGHT, INITIAL ENCOUNTER: Primary | ICD-10-CM

## 2019-06-03 NOTE — PROGRESS NOTES
HISTORY OF PRESENT ILLNESS  Rhina Zheng is a 40 y.o. female. HPI  9 sutures placed at [facility] on 5/24  Fredis Copeland on barnicle and cut open hand  Well healed, the pinky finger is still numb  No oozing or exudates noted    ROS  A comprehensive review of system was obtained and negative except findings in the HPI    Visit Vitals  /80 (BP 1 Location: Left arm, BP Patient Position: Sitting)   Pulse 100   Temp 98.4 °F (36.9 °C) (Oral)   Resp 16   Ht 5' 9\" (1.753 m)   Wt 213 lb (96.6 kg)   SpO2 99%   BMI 31.45 kg/m²     Physical Exam   Constitutional: She is oriented to person, place, and time. She appears well-developed and well-nourished. Neck: No JVD present. Cardiovascular: Normal rate, regular rhythm and intact distal pulses. Exam reveals no gallop and no friction rub. No murmur heard. Pulmonary/Chest: Effort normal and breath sounds normal. No respiratory distress. She has no wheezes. Musculoskeletal: She exhibits no edema. Neurological: She is alert and oriented to person, place, and time. Skin: Skin is warm. Left lateral hand at the joint between the pinky and palm. Well healed and no s/s infection   Nursing note and vitals reviewed. ASSESSMENT and PLAN  Encounter Diagnoses   Name Primary?  Hand laceration involving tendon, right, initial encounter Yes     No orders of the defined types were placed in this encounter. Sutures removed  Reviewed wound care and follow up    I have discussed the diagnosis with the patient and the intended plan as seen in the above orders. The patient has received an after-visit summary and questions were answered concerning future plans. Patient conveyed understanding of the plan at the time of the visit.     Mariela England, MSN, ANP  6/3/2019

## 2019-06-03 NOTE — PROGRESS NOTES
Chief Complaint   Patient presents with    Suture Removal     Pt in office today for suture removal    Pt wants to talk about nerve damage  Pt has no other concerns

## 2019-06-13 RX ORDER — GABAPENTIN 400 MG/1
400 CAPSULE ORAL 3 TIMES DAILY
Qty: 90 CAP | Refills: 5 | Status: SHIPPED | OUTPATIENT
Start: 2019-06-13 | End: 2019-11-30 | Stop reason: SDUPTHER

## 2019-06-27 DIAGNOSIS — M79.7 FIBROMYALGIA: ICD-10-CM

## 2019-06-27 DIAGNOSIS — M45.9 ANKYLOSING SPONDYLITIS, UNSPECIFIED SITE OF SPINE (HCC): ICD-10-CM

## 2019-06-28 RX ORDER — CYCLOBENZAPRINE HCL 5 MG
TABLET ORAL
Qty: 60 TAB | Refills: 0 | Status: SHIPPED | OUTPATIENT
Start: 2019-06-28 | End: 2019-07-24 | Stop reason: SDUPTHER

## 2019-07-24 DIAGNOSIS — M45.9 ANKYLOSING SPONDYLITIS, UNSPECIFIED SITE OF SPINE (HCC): ICD-10-CM

## 2019-07-24 DIAGNOSIS — M79.7 FIBROMYALGIA: ICD-10-CM

## 2019-07-26 RX ORDER — CYCLOBENZAPRINE HCL 5 MG
TABLET ORAL
Qty: 60 TAB | Refills: 0 | Status: SHIPPED | OUTPATIENT
Start: 2019-07-26 | End: 2019-08-23 | Stop reason: SDUPTHER

## 2019-08-06 DIAGNOSIS — G43.909 MIGRAINE WITHOUT STATUS MIGRAINOSUS, NOT INTRACTABLE, UNSPECIFIED MIGRAINE TYPE: ICD-10-CM

## 2019-08-06 RX ORDER — KETOROLAC TROMETHAMINE 10 MG/1
10 TABLET, FILM COATED ORAL
Qty: 10 TAB | Refills: 2 | Status: SHIPPED | OUTPATIENT
Start: 2019-08-06 | End: 2019-11-17 | Stop reason: SDUPTHER

## 2019-08-23 DIAGNOSIS — M79.7 FIBROMYALGIA: ICD-10-CM

## 2019-08-23 DIAGNOSIS — M45.9 ANKYLOSING SPONDYLITIS, UNSPECIFIED SITE OF SPINE (HCC): ICD-10-CM

## 2019-08-26 RX ORDER — CYCLOBENZAPRINE HCL 5 MG
TABLET ORAL
Qty: 60 TAB | Refills: 0 | Status: SHIPPED | OUTPATIENT
Start: 2019-08-26 | End: 2019-09-29 | Stop reason: SDUPTHER

## 2019-08-27 DIAGNOSIS — F41.9 ANXIETY: Primary | ICD-10-CM

## 2019-08-27 RX ORDER — ALPRAZOLAM 0.5 MG/1
0.5 TABLET ORAL
Qty: 10 TAB | Refills: 0 | OUTPATIENT
Start: 2019-08-27 | End: 2019-11-26

## 2019-08-30 ENCOUNTER — DOCUMENTATION ONLY (OUTPATIENT)
Dept: FAMILY MEDICINE CLINIC | Age: 44
End: 2019-08-30

## 2019-08-30 NOTE — PROGRESS NOTES
Pt never picked up rx for amphetamine-dextroamphetamine 10 mg from Oct 17, 2018. [facility]

## 2019-09-06 DIAGNOSIS — F41.9 ANXIETY: ICD-10-CM

## 2019-09-06 RX ORDER — BUSPIRONE HYDROCHLORIDE 30 MG/1
TABLET ORAL
Qty: 180 TAB | Refills: 1 | Status: SHIPPED | OUTPATIENT
Start: 2019-09-06 | End: 2020-02-23

## 2019-09-16 ENCOUNTER — DOCUMENTATION ONLY (OUTPATIENT)
Dept: FAMILY MEDICINE CLINIC | Age: 44
End: 2019-09-16

## 2019-09-29 DIAGNOSIS — M45.9 ANKYLOSING SPONDYLITIS, UNSPECIFIED SITE OF SPINE (HCC): ICD-10-CM

## 2019-09-29 DIAGNOSIS — M79.7 FIBROMYALGIA: ICD-10-CM

## 2019-09-30 RX ORDER — CYCLOBENZAPRINE HCL 5 MG
TABLET ORAL
Qty: 60 TAB | Refills: 0 | Status: SHIPPED | OUTPATIENT
Start: 2019-09-30 | End: 2019-11-02 | Stop reason: SDUPTHER

## 2019-11-02 DIAGNOSIS — M79.7 FIBROMYALGIA: ICD-10-CM

## 2019-11-02 DIAGNOSIS — M45.9 ANKYLOSING SPONDYLITIS, UNSPECIFIED SITE OF SPINE (HCC): ICD-10-CM

## 2019-11-03 RX ORDER — CYCLOBENZAPRINE HCL 5 MG
TABLET ORAL
Qty: 60 TAB | Refills: 0 | Status: SHIPPED | OUTPATIENT
Start: 2019-11-03 | End: 2019-12-01 | Stop reason: SDUPTHER

## 2019-11-17 DIAGNOSIS — G43.909 MIGRAINE WITHOUT STATUS MIGRAINOSUS, NOT INTRACTABLE, UNSPECIFIED MIGRAINE TYPE: ICD-10-CM

## 2019-11-17 RX ORDER — KETOROLAC TROMETHAMINE 10 MG/1
TABLET, FILM COATED ORAL
Qty: 10 TAB | Refills: 0 | Status: SHIPPED | OUTPATIENT
Start: 2019-11-17 | End: 2019-12-15 | Stop reason: SDUPTHER

## 2019-11-25 NOTE — PROGRESS NOTES
Annual exam ages 40-58    Lord Range is a ,  40 y.o. female [facility] No LMP recorded. Patient has had a hysterectomy. , who presents for her annual checkup. PARADISE/BSO for endometriosis. LV=2018 for AE    Since her last annual GYN exam about 1.5 years ago,  she has the following changes in her health history:   - had right surgery in Sept.  Had fragment of shell removed (had fallen in May, got stitches in ER)  - has been seen at [facility] who recommended pellets, however she is reluctant to use these. Has had problems with decreased libido. Has been on Divigel and Klye Sheerer in past.  Given RX at [facility] 2018. Has trouble using consistently, which is why she was considering the pellets. Has not used estrogen patch. ADDITIONAL CONCERNS:  She is having no significant problems. With regard to the Gardasil vaccine, she is older than the age for which it is FDA approved. Menstrual status:    Her periods are nonexistent in flow. Contraception:    The current method of family planning is status post hysterectomy. Sexual history:     reports being sexually active and has had partner(s) who are Male. She reports using the following method of birth control/protection: Surgical.        Pap and Mammogram History:    Her most recent Pap smear was normal, HPV was negative, obtained 2013. She does have a history of abnormal pap smears. Remote hx. Not sure of details. The patient had her mammogram today in our office. Osteoporosis History:    Family history does not include a first or second degree relative with osteopenia or osteoporosis. A bone density scan has never been done. Past Medical History:   Diagnosis Date    ADHD (attention deficit hyperactivity disorder)     Ankylosing spondylitis (HCC)     Endometriosis     Fibroids     Fibromyalgia     Hx of mammogram 2018    Negative. No mammographic evidence of malignancy.     Infertility, female    [facility] Screening for malignant neoplasm of the cervix 13    Negative ; HPV Negative     Past Surgical History:   Procedure Laterality Date    HX APPENDECTOMY      HX COLECTOMY      HX COLONOSCOPY      HX DILATION AND CURETTAGE      HX HYSTEROSCOPY WITH ENDOMETRIAL ABLATION      HX ORTHOPAEDIC  2013    left foot surgery cyst removed    HX PARADISE AND BSO  07    due to endometriosis     OB History    Para Term  AB Living   1 1 1 0 0 1   SAB TAB Ectopic Molar Multiple Live Births   0 0 0   0 1      # Outcome Date GA Lbr Sohail/2nd Weight Sex Delivery Anes PTL Lv   1 Term 05 38w0d 10:00 7 lb 12 oz (3.515 kg) M VAGINAL DELI None  DORINDA       Current Outpatient Medications   Medication Sig Dispense Refill    [START ON 2019] estradiol (CLIMARA) 0.05 mg/24 hr 1 Patch by TransDERmal route Every Saturday. 12 Patch 4    ketorolac (TORADOL) 10 mg tablet TAKE 1 TABLET BY MOUTH EVERY 6 HOURS AS NEEDED FOR PAIN 10 Tab 0    cyclobenzaprine (FLEXERIL) 5 mg tablet TAKE 1 TABLET BY MOUTH THREE TIMES DAILY AS NEEDED FOR MUSCLE SPASMS 60 Tab 0    busPIRone (BUSPAR) 30 mg tablet TAKE 1 TABLET BY MOUTH TWICE DAILY 180 Tab 1    gabapentin (NEURONTIN) 400 mg capsule Take 1 Cap by mouth three (3) times daily.  90 Cap 5     Allergies: Phenergan [promethazine] and Qsymia [phentermine-topiramate]   Social History     Socioeconomic History    Marital status:      Spouse name: Not on file    Number of children: Not on file    Years of education: Not on file    Highest education level: Not on file   Occupational History    Not on file   Social Needs    Financial resource strain: Not on file    Food insecurity:     Worry: Not on file     Inability: Not on file    Transportation needs:     Medical: Not on file     Non-medical: Not on file   Tobacco Use    Smoking status: Former Smoker     Last attempt to quit: 2017     Years since quittin.3    Smokeless tobacco: Never Used   [facility] Tobacco comment: Never used vapor or e-cigs    Substance and Sexual Activity    Alcohol use: No     Alcohol/week: 0.0 standard drinks    Drug use: No    Sexual activity: Yes     Partners: Male     Birth control/protection: Surgical   Lifestyle    Physical activity:     Days per week: Not on file     Minutes per session: Not on file    Stress: Not on file   Relationships    Social connections:     Talks on phone: Not on file     Gets together: Not on file     Attends Restoration service: Not on file     Active member of club or organization: Not on file     Attends meetings of clubs or organizations: Not on file     Relationship status: Not on file    Intimate partner violence:     Fear of current or ex partner: Not on file     Emotionally abused: Not on file     Physically abused: Not on file     Forced sexual activity: Not on file   Other Topics Concern    Not on file   Social History Narrative    Not on file     Tobacco History:  reports that she quit smoking about 2 years ago. She has never used smokeless tobacco.  Alcohol Abuse:  reports no history of alcohol use. Drug Abuse:  reports no history of drug use.     Patient Active Problem List   Diagnosis Code    ADHD (attention deficit hyperactivity disorder) F90.9    Ankylosing spondylitis ([facility] [address] M45.9    Endometriosis N80.9    Fibroids D21.9    Fibromyalgia M79.7    Migraine G43.909     Family History   Problem Relation Age of Onset    Alcohol abuse Other     Arthritis-rheumatoid Other     Stroke Other     Cancer Other         bladder and tongue    Stroke Maternal Grandmother        Review of Systems - History obtained from the patient  Constitutional: negative for weight loss, fever, night sweats  HEENT: negative for hearing loss, earache, congestion, snoring, sorethroat  CV: negative for chest pain, palpitations, edema  Resp: negative for cough, shortness of breath, wheezing  GI: negative for change in bowel habits, abdominal pain, black or bloody stools  : negative for frequency, dysuria, hematuria, vaginal discharge  MSK: negative for back pain, joint pain, muscle pain  Breast: negative for breast lumps, nipple discharge, galactorrhea  Skin :negative for itching, rash, hives  Neuro: negative for dizziness, headache, confusion, weakness  Psych: negative for anxiety, depression, change in mood  Heme/lymph: negative for bleeding, bruising, pallor    Physical Exam    Visit Vitals  /70   Wt 197 lb (89.4 kg)   BMI 29.09 kg/m²       Constitutional  · Appearance: well-nourished, well developed, alert, in no acute distress    HENT  · Head and Face: appears normal    Neck  · Inspection/Palpation: normal appearance, no masses or tenderness  · Lymph Nodes: no lymphadenopathy present  · Thyroid: gland size normal, nontender, no nodules or masses present on palpation    Chest  · Respiratory Effort: breathing unlabored  · Auscultation: normal breath sounds    Cardiovascular  · Heart:  · Auscultation: regular rate and rhythm without murmur    Breasts  · Inspection of Breasts: breasts symmetrical, no skin changes, no discharge present, nipple appearance normal, no skin retraction present  · Palpation of Breasts and Axillae: no masses present on palpation, no breast tenderness  · Axillary Lymph Nodes: no lymphadenopathy present    Gastrointestinal  · Abdominal Examination: abdomen non-tender to palpation, normal bowel sounds, no masses present  · Liver and spleen: no hepatomegaly present, spleen not palpable  · Hernias: no hernias identified    Genitourinary  · External Genitalia: normal appearance for age, no discharge present, no tenderness present, no inflammatory lesions present, no masses present, minimal atrophy present  · Vagina: normal vaginal vault without central or paravaginal defects, no discharge present, no inflammatory lesions present, no masses present  · Bladder: non-tender to palpation  · Urethra: appears normal  · Cervix: abssent   · Uterus: absent  · Adnexa: no adnexal tenderness present, no adnexal masses present  · Perineum: perineum within normal limits, no evidence of trauma, no rashes or skin lesions present  · Anus: anus within normal limits, no hemorrhoids present  · Inguinal Lymph Nodes: no lymphadenopathy present    Skin  · General Inspection: no rash, no lesions identified    Neurologic/Psychiatric  · Mental Status:  · Orientation: grossly oriented to person, place and time  · Mood and Affect: mood normal, affect appropriate    Results for orders placed or performed in visit on 19   [facility] MAMMO BI SCREENING INCL CAD    Narrative    STUDY:  Bilateral Digital Screening Mammogram    INDICATION:  Screening. Direct comparison is made to prior mammograms. BREAST COMPOSITION: There are scattered fibroglandular densities (approximately  25-50% glandular). FINDINGS: Bilateral digital screening mammography was performed, and is  interpreted in conjunction with a computer assisted detection (CAD) system. The  breast parenchyma is stable bilaterally. No suspicious masses or calcifications  are identified. There is no skin thickening or nipple retraction. There has been  no significant change. Impression    IMPRESSION:    BIRADS 1: Negative. No mammographic evidence of malignancy. Next screening mammogram is recommended in one year. The patient will be  notified of these results. Assessment & Plan:  · Routine gynecologic examination. Pap/HPV done. · Remote h/o abnl pap, not sure of details. Discussed will continue to monitor until nl x20yrs. · Menopausal.  Decreased libido. Would like to try estrogen patch. Fariba jhaveri. · Counseled re: diet, exercise, healthy lifestyle  · Return for yearly wellness visits  · Rec annual mammogram. BR-1 today. · Patient verbalized understanding           Orders Placed This Encounter    estradiol (CLIMARA) 0.05 mg/24 hr     Si Patch by TransDERmal route Every Saturday. Dispense:  12 Patch     Refill:  4    PAP IG, HPV AND RFX HPV 32/78,47([id number])     Order Specific Question:   Pap Source? Answer:   Vaginal     Order Specific Question:   Total Hysterectomy? Answer:   Yes     Order Specific Question:   Supracervical Hysterectomy? Answer:   No     Order Specific Question:   Post Menopausal?     Answer:   No     Order Specific Question:   Hormone Therapy? Answer:   No     Order Specific Question:   IUD? Answer:   No     Order Specific Question:   Abnormal Bleeding? Answer:   No     Order Specific Question:   Pregnant     Answer:   No     Order Specific Question:   Post Partum? Answer:    No

## 2019-11-26 ENCOUNTER — OFFICE VISIT (OUTPATIENT)
Dept: OBGYN CLINIC | Age: 44
End: 2019-11-26

## 2019-11-26 VITALS — WEIGHT: 197 LBS | DIASTOLIC BLOOD PRESSURE: 70 MMHG | SYSTOLIC BLOOD PRESSURE: 104 MMHG | BODY MASS INDEX: 29.09 KG/M2

## 2019-11-26 DIAGNOSIS — N95.1 MENOPAUSAL STATE: ICD-10-CM

## 2019-11-26 DIAGNOSIS — R68.82 DECREASED LIBIDO: ICD-10-CM

## 2019-11-26 DIAGNOSIS — Z01.419 ENCOUNTER FOR WELL WOMAN EXAM WITH ROUTINE GYNECOLOGICAL EXAM: Primary | ICD-10-CM

## 2019-11-26 RX ORDER — ESTRADIOL 0.05 MG/D
1 PATCH TRANSDERMAL
Qty: 12 PATCH | Refills: 4 | Status: SHIPPED | OUTPATIENT
Start: 2019-11-30 | End: 2021-01-28 | Stop reason: SDUPTHER

## 2019-11-30 DIAGNOSIS — M79.7 FIBROMYALGIA: Primary | ICD-10-CM

## 2019-12-01 DIAGNOSIS — M79.7 FIBROMYALGIA: ICD-10-CM

## 2019-12-01 DIAGNOSIS — M45.9 ANKYLOSING SPONDYLITIS, UNSPECIFIED SITE OF SPINE (HCC): ICD-10-CM

## 2019-12-01 RX ORDER — GABAPENTIN 400 MG/1
CAPSULE ORAL
Qty: 90 CAP | Refills: 0 | Status: SHIPPED | OUTPATIENT
Start: 2019-12-01 | End: 2019-12-29

## 2019-12-02 RX ORDER — CYCLOBENZAPRINE HCL 5 MG
TABLET ORAL
Qty: 60 TAB | Refills: 0 | Status: SHIPPED | OUTPATIENT
Start: 2019-12-02 | End: 2020-01-03 | Stop reason: SDUPTHER

## 2019-12-03 LAB
CYTOLOGIST CVX/VAG CYTO: NORMAL
CYTOLOGY CVX/VAG DOC CYTO: NORMAL
CYTOLOGY CVX/VAG DOC THIN PREP: NORMAL
CYTOLOGY HISTORY:: NORMAL
DX ICD CODE: NORMAL
HPV I/H RISK 1 DNA CVX QL PROBE+SIG AMP: NEGATIVE
Lab: NORMAL
Lab: NORMAL
OTHER STN SPEC: NORMAL
STAT OF ADQ CVX/VAG CYTO-IMP: NORMAL

## 2019-12-15 DIAGNOSIS — G43.909 MIGRAINE WITHOUT STATUS MIGRAINOSUS, NOT INTRACTABLE, UNSPECIFIED MIGRAINE TYPE: ICD-10-CM

## 2019-12-15 RX ORDER — KETOROLAC TROMETHAMINE 10 MG/1
TABLET, FILM COATED ORAL
Qty: 10 TAB | Refills: 0 | Status: SHIPPED | OUTPATIENT
Start: 2019-12-15 | End: 2020-01-12

## 2019-12-28 DIAGNOSIS — M79.7 FIBROMYALGIA: ICD-10-CM

## 2019-12-29 RX ORDER — GABAPENTIN 400 MG/1
CAPSULE ORAL
Qty: 90 CAP | Refills: 1 | Status: SHIPPED | OUTPATIENT
Start: 2019-12-29 | End: 2020-02-23

## 2020-01-03 ENCOUNTER — OFFICE VISIT (OUTPATIENT)
Dept: FAMILY MEDICINE CLINIC | Age: 45
End: 2020-01-03

## 2020-01-03 VITALS
WEIGHT: 207 LBS | BODY MASS INDEX: 30.66 KG/M2 | SYSTOLIC BLOOD PRESSURE: 107 MMHG | HEART RATE: 97 BPM | HEIGHT: 69 IN | TEMPERATURE: 97.8 F | DIASTOLIC BLOOD PRESSURE: 74 MMHG | OXYGEN SATURATION: 95 % | RESPIRATION RATE: 18 BRPM

## 2020-01-03 DIAGNOSIS — M45.9 ANKYLOSING SPONDYLITIS, UNSPECIFIED SITE OF SPINE (HCC): ICD-10-CM

## 2020-01-03 DIAGNOSIS — M54.2 NECK PAIN: Primary | ICD-10-CM

## 2020-01-03 DIAGNOSIS — M79.7 FIBROMYALGIA: ICD-10-CM

## 2020-01-03 RX ORDER — CYCLOBENZAPRINE HCL 5 MG
TABLET ORAL
Qty: 60 TAB | Refills: 2 | Status: SHIPPED | OUTPATIENT
Start: 2020-01-03 | End: 2020-04-20 | Stop reason: SDUPTHER

## 2020-01-03 NOTE — PROGRESS NOTES
Chief Complaint   Patient presents with    Neck Pain     Patient presents in office today with c/o neck pain for about a month. Treating with ibuprofen and Tylenol with some relief. No other concerns. 1. Have you been to the ER, urgent care clinic since your last visit? Hospitalized since your last visit? No    2. Have you seen or consulted any other health care providers outside of the [address] [place] [address] since your last visit? Include any pap smears or colon screening.  No      Learning Assessment 8/3/2017   PRIMARY LEARNER Patient   HIGHEST LEVEL OF EDUCATION - PRIMARY LEARNER  GRADUATED HIGH SCHOOL OR GED   BARRIERS PRIMARY LEARNER NONE   CO-LEARNER CAREGIVER -   PRIMARY LANGUAGE ENGLISH   LEARNER PREFERENCE PRIMARY DEMONSTRATION   ANSWERED BY patient   RELATIONSHIP SELF
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
## 2020-01-11 DIAGNOSIS — G43.909 MIGRAINE WITHOUT STATUS MIGRAINOSUS, NOT INTRACTABLE, UNSPECIFIED MIGRAINE TYPE: ICD-10-CM

## 2020-01-12 RX ORDER — KETOROLAC TROMETHAMINE 10 MG/1
TABLET, FILM COATED ORAL
Qty: 10 TAB | Refills: 0 | Status: SHIPPED | OUTPATIENT
Start: 2020-01-12 | End: 2020-02-02

## 2020-02-01 DIAGNOSIS — G43.909 MIGRAINE WITHOUT STATUS MIGRAINOSUS, NOT INTRACTABLE, UNSPECIFIED MIGRAINE TYPE: ICD-10-CM

## 2020-02-02 RX ORDER — KETOROLAC TROMETHAMINE 10 MG/1
TABLET, FILM COATED ORAL
Qty: 10 TAB | Refills: 0 | Status: SHIPPED | OUTPATIENT
Start: 2020-02-02 | End: 2020-03-01

## 2020-02-07 ENCOUNTER — DOCUMENTATION ONLY (OUTPATIENT)
Dept: FAMILY MEDICINE CLINIC | Age: 45
End: 2020-02-07

## 2020-02-07 NOTE — PROGRESS NOTES
Teresa Solitario at [facility] records request was faxed to [employer] at [phone] to be processed on 02/07/2020

## 2020-02-29 DIAGNOSIS — G43.909 MIGRAINE WITHOUT STATUS MIGRAINOSUS, NOT INTRACTABLE, UNSPECIFIED MIGRAINE TYPE: ICD-10-CM

## 2020-03-01 RX ORDER — KETOROLAC TROMETHAMINE 10 MG/1
TABLET, FILM COATED ORAL
Qty: 10 TAB | Refills: 0 | Status: SHIPPED | OUTPATIENT
Start: 2020-03-01 | End: 2020-04-04

## 2020-04-03 DIAGNOSIS — G43.909 MIGRAINE WITHOUT STATUS MIGRAINOSUS, NOT INTRACTABLE, UNSPECIFIED MIGRAINE TYPE: ICD-10-CM

## 2020-04-04 RX ORDER — KETOROLAC TROMETHAMINE 10 MG/1
TABLET, FILM COATED ORAL
Qty: 10 TAB | Refills: 0 | Status: SHIPPED | OUTPATIENT
Start: 2020-04-04 | End: 2020-05-06

## 2020-04-12 DIAGNOSIS — M79.7 FIBROMYALGIA: ICD-10-CM

## 2020-04-12 RX ORDER — GABAPENTIN 400 MG/1
CAPSULE ORAL
Qty: 90 CAP | Refills: 1 | Status: SHIPPED | OUTPATIENT
Start: 2020-04-12 | End: 2020-06-03

## 2020-04-20 DIAGNOSIS — M45.9 ANKYLOSING SPONDYLITIS, UNSPECIFIED SITE OF SPINE (HCC): ICD-10-CM

## 2020-04-20 DIAGNOSIS — M54.2 NECK PAIN: ICD-10-CM

## 2020-04-20 DIAGNOSIS — M79.7 FIBROMYALGIA: ICD-10-CM

## 2020-04-20 RX ORDER — CYCLOBENZAPRINE HCL 5 MG
TABLET ORAL
Qty: 60 TAB | Refills: 2 | Status: SHIPPED | OUTPATIENT
Start: 2020-04-20 | End: 2020-07-17

## 2020-05-05 DIAGNOSIS — G43.909 MIGRAINE WITHOUT STATUS MIGRAINOSUS, NOT INTRACTABLE, UNSPECIFIED MIGRAINE TYPE: ICD-10-CM

## 2020-05-06 RX ORDER — KETOROLAC TROMETHAMINE 10 MG/1
TABLET, FILM COATED ORAL
Qty: 10 TAB | Refills: 0 | Status: SHIPPED | OUTPATIENT
Start: 2020-05-06 | End: 2020-06-03

## 2020-05-19 ENCOUNTER — VIRTUAL VISIT (OUTPATIENT)
Dept: FAMILY MEDICINE CLINIC | Age: 45
End: 2020-05-19

## 2020-05-19 DIAGNOSIS — M25.50 MULTIPLE JOINT PAIN: Primary | ICD-10-CM

## 2020-05-19 DIAGNOSIS — H04.123 DRY EYE SYNDROME OF BOTH EYES: ICD-10-CM

## 2020-05-19 NOTE — PROGRESS NOTES
Peña Chaves is a 39 y.o. female who was seen by synchronous (real-time) audio-video technology on 5/19/2020. Consent: Peña Chaves, who was seen by synchronous (real-time) audio-video technology, and/or her healthcare decision maker, is aware that this patient-initiated, Telehealth encounter on 5/19/2020 is a billable service, with coverage as determined by her insurance carrier. She is aware that she may receive a bill and has provided verbal consent to proceed: Yes. I spent at least 23 minutes on this visit with this established patient. [phone]  Subjective:   Peña Chaves is a 39 y.o. female who was seen for Joint Pain  dx with Ank Spondylosis 10 years ago, not on any meds for it   Did not like being on pain meds  Having intermittent but severe global joint pain and dry eyes  Mom with RA as well    Prior to Admission medications    Medication Sig Start Date End Date Taking? Authorizing Provider   ketorolac (TORADOL) 10 mg tablet TAKE 1 TABLET BY MOUTH EVERY 6 HOURS AS NEEDED FOR PAIN 5/6/20   Vona Pallas, NP   cyclobenzaprine (FLEXERIL) 5 mg tablet TAKE 1 TABLET BY MOUTH THREE TIMES DAILY AS NEEDED FOR MUSCLE SPASMS 4/20/20   Vona Pallas, NP   gabapentin (NEURONTIN) 400 mg capsule TAKE 1 CAPSULE BY MOUTH THREE TIMES DAILY 4/12/20   Vona Pallas, NP   busPIRone (BUSPAR) 30 mg tablet TAKE 1 TABLET BY MOUTH TWICE DAILY 2/23/20   Vona Pallas, NP   estradiol (CLIMARA) 0.05 mg/24 hr 1 Patch by TransDERmal route Every Saturday.  11/30/19   Shaniqua Schultz MD     Allergies   Allergen Reactions    Phenergan [Promethazine] Unknown (comments)    Qsymia [Phentermine-Topiramate] Other (comments)     Vision loss        Patient Active Problem List    Diagnosis Date Noted    Migraine 03/15/2016    ADHD (attention deficit hyperactivity disorder)     Ankylosing spondylitis (HCC)     Endometriosis     Fibroids     Fibromyalgia        ROS  A comprehensive review of system was obtained and negative except findings in the HPI      Objective: There were no vitals taken for this visit. General: alert, cooperative, no distress   Mental  status: normal mood, behavior, speech, dress, motor activity, and thought processes, able to follow commands   HENT: NCAT   Neck: no visualized mass   Resp: no respiratory distress   Neuro: no gross deficits   Skin: no discoloration or lesions of concern on visible areas   Psychiatric: normal affect, consistent with stated mood, no evidence of hallucinations     Additional exam findings: none    Diagnoses and all orders for this visit:    1. Multiple joint pain  -     SJOGREN'S ABS, SSA AND SSB  -     DAYANA+RA QN    2. Dry eye syndrome of both eyes  -     SJOGREN'S ABS, SSA AND SSB  -     DAYANA+RA QN      Will draw labs for autoimmune dx  Dev plan with results      We discussed the expected course, resolution and complications of the diagnosis(es) in detail. Medication risks, benefits, costs, interactions, and alternatives were discussed as indicated. I advised her to contact the office if her condition worsens, changes or fails to improve as anticipated. She expressed understanding with the diagnosis(es) and plan. Marie David is a 39 y.o. female who was evaluated by a video visit encounter for concerns as above. Patient identification was verified prior to start of the visit. A caregiver was present when appropriate. Due to this being a TeleHealth encounter (During [facility]- public health emergency), evaluation of the following organ systems was limited: Vitals/Constitutional/EENT/Resp/CV/GI//MS/Neuro/Skin/Heme-Lymph-Imm.   Pursuant to the emergency declaration under the [address] [place] [address] authority and the [employer] Act, this Virtual  Visit was conducted, with patient's (and/or legal guardian's) consent, to reduce the patient's risk of exposure to COVID-19 and provide necessary medical care. Services were provided through a video synchronous discussion virtually to substitute for in-person clinic visit. Patient and provider were located at their individual homes.       Randee Jarvis NP

## 2020-05-22 LAB
ANA SER QL: NEGATIVE
ENA SS-A AB SER-ACNC: <0.2 AI (ref 0–0.9)
ENA SS-B AB SER-ACNC: <0.2 AI (ref 0–0.9)
RHEUMATOID FACT SERPL-ACNC: <10 IU/ML (ref 0–13.9)

## 2020-05-24 NOTE — PROGRESS NOTES
Hey there, looks like all labs for rheumatoid, lupus and sjogrens are completely normal. Illa Slight

## 2020-06-03 DIAGNOSIS — G43.909 MIGRAINE WITHOUT STATUS MIGRAINOSUS, NOT INTRACTABLE, UNSPECIFIED MIGRAINE TYPE: ICD-10-CM

## 2020-06-03 DIAGNOSIS — M79.7 FIBROMYALGIA: ICD-10-CM

## 2020-06-03 RX ORDER — KETOROLAC TROMETHAMINE 10 MG/1
TABLET, FILM COATED ORAL
Qty: 10 TAB | Refills: 0 | Status: SHIPPED | OUTPATIENT
Start: 2020-06-03 | End: 2020-07-19

## 2020-06-03 RX ORDER — GABAPENTIN 400 MG/1
CAPSULE ORAL
Qty: 90 CAP | Refills: 2 | Status: SHIPPED | OUTPATIENT
Start: 2020-06-03 | End: 2020-08-04 | Stop reason: SDUPTHER

## 2020-06-04 DIAGNOSIS — F41.9 ANXIETY: ICD-10-CM

## 2020-06-04 RX ORDER — BUSPIRONE HYDROCHLORIDE 30 MG/1
TABLET ORAL
Qty: 180 TAB | Refills: 1 | Status: SHIPPED | OUTPATIENT
Start: 2020-06-04 | End: 2021-01-28

## 2020-06-14 ENCOUNTER — E-VISIT (OUTPATIENT)
Dept: FAMILY MEDICINE CLINIC | Age: 45
End: 2020-06-14

## 2020-06-14 DIAGNOSIS — L30.9 ECZEMA, UNSPECIFIED TYPE: Primary | ICD-10-CM

## 2020-06-15 RX ORDER — PREDNISONE 10 MG/1
TABLET ORAL
Qty: 21 TAB | Refills: 0 | Status: SHIPPED | OUTPATIENT
Start: 2020-06-15 | End: 2020-09-29

## 2020-06-16 DIAGNOSIS — F41.9 ANXIETY: Primary | ICD-10-CM

## 2020-06-16 RX ORDER — ALPRAZOLAM 0.25 MG/1
0.25 TABLET ORAL
Qty: 10 TAB | Refills: 0 | Status: SHIPPED | OUTPATIENT
Start: 2020-06-16 | End: 2021-02-21 | Stop reason: SDUPTHER

## 2020-07-08 ENCOUNTER — E-VISIT (OUTPATIENT)
Dept: PRIMARY CARE CLINIC | Age: 45
End: 2020-07-08

## 2020-07-08 DIAGNOSIS — L25.9 CONTACT DERMATITIS, UNSPECIFIED CONTACT DERMATITIS TYPE, UNSPECIFIED TRIGGER: Primary | ICD-10-CM

## 2020-07-08 RX ORDER — MOMETASONE FUROATE 1 MG/G
CREAM TOPICAL
Qty: 45 G | Refills: 1 | Status: SHIPPED | OUTPATIENT
Start: 2020-07-08 | End: 2021-01-28

## 2020-07-16 DIAGNOSIS — M79.7 FIBROMYALGIA: ICD-10-CM

## 2020-07-16 DIAGNOSIS — M45.9 ANKYLOSING SPONDYLITIS, UNSPECIFIED SITE OF SPINE (HCC): ICD-10-CM

## 2020-07-16 DIAGNOSIS — M54.2 NECK PAIN: ICD-10-CM

## 2020-07-17 DIAGNOSIS — G43.909 MIGRAINE WITHOUT STATUS MIGRAINOSUS, NOT INTRACTABLE, UNSPECIFIED MIGRAINE TYPE: ICD-10-CM

## 2020-07-17 RX ORDER — CYCLOBENZAPRINE HCL 5 MG
TABLET ORAL
Qty: 60 TAB | Refills: 2 | Status: SHIPPED | OUTPATIENT
Start: 2020-07-17 | End: 2020-08-04 | Stop reason: SDUPTHER

## 2020-07-19 RX ORDER — KETOROLAC TROMETHAMINE 10 MG/1
TABLET, FILM COATED ORAL
Qty: 10 TAB | Refills: 0 | Status: SHIPPED | OUTPATIENT
Start: 2020-07-19 | End: 2020-07-27 | Stop reason: SDUPTHER

## 2020-07-27 DIAGNOSIS — G43.909 MIGRAINE WITHOUT STATUS MIGRAINOSUS, NOT INTRACTABLE, UNSPECIFIED MIGRAINE TYPE: ICD-10-CM

## 2020-07-27 DIAGNOSIS — M79.7 FIBROMYALGIA: ICD-10-CM

## 2020-07-27 DIAGNOSIS — M54.2 NECK PAIN: ICD-10-CM

## 2020-07-27 DIAGNOSIS — M45.9 ANKYLOSING SPONDYLITIS, UNSPECIFIED SITE OF SPINE (HCC): ICD-10-CM

## 2020-07-27 RX ORDER — KETOROLAC TROMETHAMINE 10 MG/1
TABLET, FILM COATED ORAL
Qty: 10 TAB | Refills: 0 | Status: SHIPPED | OUTPATIENT
Start: 2020-07-27 | End: 2020-07-29

## 2020-07-27 RX ORDER — CYCLOBENZAPRINE HCL 5 MG
TABLET ORAL
Qty: 60 TAB | Refills: 2 | OUTPATIENT
Start: 2020-07-27

## 2020-07-27 RX ORDER — GABAPENTIN 400 MG/1
CAPSULE ORAL
Qty: 90 CAP | Refills: 2 | OUTPATIENT
Start: 2020-07-27

## 2020-07-29 RX ORDER — DICLOFENAC SODIUM 75 MG/1
75 TABLET, DELAYED RELEASE ORAL 2 TIMES DAILY
Qty: 60 TAB | Refills: 2 | Status: SHIPPED | OUTPATIENT
Start: 2020-07-29 | End: 2020-08-12

## 2020-08-04 DIAGNOSIS — M79.7 FIBROMYALGIA: ICD-10-CM

## 2020-08-04 DIAGNOSIS — M54.2 NECK PAIN: ICD-10-CM

## 2020-08-04 DIAGNOSIS — M45.9 ANKYLOSING SPONDYLITIS, UNSPECIFIED SITE OF SPINE (HCC): ICD-10-CM

## 2020-08-04 RX ORDER — GABAPENTIN 400 MG/1
CAPSULE ORAL
Qty: 270 CAP | Refills: 1 | Status: SHIPPED | OUTPATIENT
Start: 2020-08-04 | End: 2020-08-23

## 2020-08-04 RX ORDER — CYCLOBENZAPRINE HCL 5 MG
TABLET ORAL
Qty: 180 TAB | Refills: 1 | Status: SHIPPED | OUTPATIENT
Start: 2020-08-04 | End: 2020-11-19

## 2020-09-02 RX ORDER — CELECOXIB 200 MG/1
200 CAPSULE ORAL DAILY
Qty: 30 CAP | Refills: 2 | Status: SHIPPED | OUTPATIENT
Start: 2020-09-02 | End: 2020-09-15 | Stop reason: SDUPTHER

## 2020-09-15 DIAGNOSIS — M79.7 FIBROMYALGIA: ICD-10-CM

## 2020-09-15 RX ORDER — CELECOXIB 200 MG/1
200 CAPSULE ORAL 2 TIMES DAILY
Qty: 60 CAP | Refills: 2 | Status: SHIPPED | OUTPATIENT
Start: 2020-09-15 | End: 2020-12-14

## 2020-09-15 RX ORDER — GABAPENTIN 400 MG/1
400 CAPSULE ORAL 4 TIMES DAILY
Qty: 120 CAP | Refills: 2 | Status: SHIPPED | OUTPATIENT
Start: 2020-09-15 | End: 2020-12-23 | Stop reason: SDUPTHER

## 2020-09-29 ENCOUNTER — OFFICE VISIT (OUTPATIENT)
Dept: RHEUMATOLOGY | Age: 45
End: 2020-09-29
Payer: COMMERCIAL

## 2020-09-29 VITALS
DIASTOLIC BLOOD PRESSURE: 78 MMHG | WEIGHT: 206 LBS | HEIGHT: 69 IN | TEMPERATURE: 97.9 F | BODY MASS INDEX: 30.51 KG/M2 | HEART RATE: 94 BPM | SYSTOLIC BLOOD PRESSURE: 110 MMHG | RESPIRATION RATE: 18 BRPM

## 2020-09-29 DIAGNOSIS — G89.29 CHRONIC BACK PAIN GREATER THAN 3 MONTHS DURATION: ICD-10-CM

## 2020-09-29 DIAGNOSIS — M77.11 LATERAL EPICONDYLITIS OF RIGHT ELBOW: Primary | ICD-10-CM

## 2020-09-29 DIAGNOSIS — R21 RASH AND NONSPECIFIC SKIN ERUPTION: ICD-10-CM

## 2020-09-29 DIAGNOSIS — M54.9 CHRONIC BACK PAIN GREATER THAN 3 MONTHS DURATION: ICD-10-CM

## 2020-09-29 PROCEDURE — 99205 OFFICE O/P NEW HI 60 MIN: CPT | Performed by: INTERNAL MEDICINE

## 2020-09-29 RX ORDER — LANOLIN ALCOHOL/MO/W.PET/CERES
CREAM (GRAM) TOPICAL DAILY
COMMUNITY
End: 2022-04-26

## 2020-09-29 NOTE — LETTER
9/29/20 Patient: Divine Hines YOB: 1975 Date of Visit: 9/29/2020 Khurram Alford MD 
[address] [address] VIA In Basket Tiffany Etienne NP 
[id number] [address] VIA In Basket Dear MD Tiffany Bell NP, Thank you for referring Ms. Divine Hines to [facility] for evaluation. My notes for this consultation are attached. If you have questions, please do not hesitate to call me. I look forward to following your patient along with you.  
 
 
Sincerely, 
 
Concetta Alas MD

## 2020-09-29 NOTE — PROGRESS NOTES
REASON FOR VISIT    This is an evaluation for Ms. Robert Schaefer a 39 y.o.  female for diffuse pain. The patient is self-referred to the [facility]. HISTORY OF PRESENT ILLNESS     I have reviewed and summarized old records from [facility] and [facility]    In 9/12/2019, Right Finger radiograph showed  no acute fractures or subluxations.  However there does appear to be a foreign body proximal to the web space between the 4th and 5th digits on the ulnar side at the base of the proximal phalanx that appears to be volar ulnar and there appears to be some calcification.  This does correspond with the area of race skin seen grossly  In 12/2019, she developed dull aching, burning pain in her right shoulder, elbow, and wrist associated with left sided back pain. In 5/19/2020, labs showed negative DAYANA direct, rheumatoid factor, anti-Ro, anti-La    In 8/11/2020, Lumbar Spine Mild disc degeneration at l4-s1. No evidence of Fracture, dislocation or osseous destructive lesions is seen. Today, she reports that this year, she had an episode of foreign body sensation in eye (unknown laterality), which felt gritty associated with fatigue and exhaustion. She does not recur how long it lasted. She had another occurrence which was worse and needed to miss one day of work - she rarely misses work. In 7/2020, she developed low back pain and right elbow pain that was atraumatic. She saw Dr. Allyssa Abad, hand orthopedics, who felt she had lateral epicondylitis and wore a band and then a wrist brace. She followed up with him last week requesting an injection and also reported right shoulder pain. He injected her lateral epicondyle on 9/23/2020 without relief. She continues to have pain in her arm and wrist which is burning. Diclofenac had helped but she had GI upset so was transitioned to Celebrex which resolved her pain for 2 weeks and then recurred.      She had been seeing Dr. Gunnar Lozano at [facility] for her neck and will be seeing her for her lower back. She has done physical therapy. She also developed a pruritic rash on the base of her thumbs that started in 4-5/2020 and 6/2020 that was treated with topical steroid prescribed by her PCP. She has not seen a dermatologist.    Her mother has fibromyalgia and ankylosing spondylitis. She has notes having nodules on her DIPs like her mother. REVIEW OF SYSTEMS    A 15 point review of systems was performed and summarized below. The questionnaire was reviewed with the patient and scanned into the patient's medical record.     General: denies recent weight gain, recent weight loss, fatigue, weakness, fever, drenching night sweats  Musculoskeletal: endorses joint pain, morning stiffness, muscle pain, denies joint swelling  Ears: denies ringing in ears, hearing loss, deafness  Eyes: denies pain, light sensitive, redness, blindness, double vision, blurred vision, excess tearing, dryness, foreign body sensation  Mouth: denies sore tongue, oral ulcers, loss of taste, dryness, increased dental caries  Nose: denies nosebleeds, nasal ulcers  Throat: denies food stuck when swallowing, difficulty with swallowing, hoarseness, pain in jaw while chewing  Neck: denies swollen glands, tender glands  Cardiopulmonary: denies pain in chest with deep breaths, pain in chest when lying down, murmurs, sudden changes in heart beat, wheezing, dry cough, productive cough, shortness of breath at rest, shortness of breath on exertion, coughing of blood  Gastrointestinal: denies nausea, heartburn, stomach pain relieved by food, chronic constipation, chronic diarrhea, blood in stools, black stools  Genitourinary: denies vaginal dryness, pain or burning on urination, blood in urine, cloudy urine, vaginal ulcers  Hematologic: denies anemia, bleeding tendency, blood clots, bleeding gums  Skin: endorses rash, nodules/bumps, denies easy bruising, hair loss, rash worsened after sun exposure, hives/urticaria, skin thickening, skin tightness, color changes of hands or feet in the cold (Raynaud's)  Neurologic: denies numbness or tingling in hands, numbness or tingling in feet, muscle weakness  Psychiatric: denies depression, excessive worries, PTSD, Bipolar  Sleep: endorses poor sleep (5-6 hours), difficulty staying asleep , denies snoring, apnea, daytime somnolence, difficulty falling asleep    PAST MEDICAL HISTORY    She has a past medical history of ADHD (attention deficit hyperactivity disorder), Endometriosis, Fibroids, Fibromyalgia, mammogram (2019), Infertility, female, and Screening for malignant neoplasm of the cervix (2019). FAMILY HISTORY    Her family history includes Alcohol abuse in an other family member; Arthritis-rheumatoid in an other family member; Cancer in an other family member; Stroke in her maternal grandmother and another family member. SOCIAL HISTORY    She reports that she quit smoking about 3 years ago. She has never used smokeless tobacco. She reports that she does not drink alcohol or use drugs. GYNECOLOGIC HISTORY     1, Para 1, Living 1, Miscarriage 0    She denies severe pre-eclampsia, eclampsia or placental insufficiency    HEALTH MAINTENANCE    Immunizations  Immunization History   Administered Date(s) Administered    Influenza Vaccine 10/15/2015, 10/28/2019    Influenza Vaccine (Quad) 10/13/2017    Tdap 2019       Age Appropriate Cancer Screening    PAP Smear: 2019  Mammogram: 2020    MEDICATIONS    Current Outpatient Medications   Medication Sig Dispense Refill    melatonin 3 mg tablet Take  by mouth daily.  celecoxib (CELEBREX) 200 mg capsule Take 1 Cap by mouth two (2) times a day for 90 days. 60 Cap 2    gabapentin (NEURONTIN) 400 mg capsule Take 1 Cap by mouth four (4) times daily.  Max Daily Amount: 1,600 mg. 120 Cap 2    cyclobenzaprine (FLEXERIL) 5 mg tablet TAKE 1 TABLET BY MOUTH THREE TIMES DAILY AS NEEDED FOR MUSCLE SPASMS 180 Tab 1    mometasone (ELOCON) 0.1 % topical cream Apply  to affected area two (2) times daily as needed for Skin Irritation or Itching. 45 g 1    ALPRAZolam (XANAX) 0.25 mg tablet Take 1 Tab by mouth two (2) times daily as needed for Anxiety. Max Daily Amount: 0.5 mg. 10 Tab 0    busPIRone (BUSPAR) 30 mg tablet TAKE 1 TABLET BY MOUTH TWICE DAILY 180 Tab 1    estradiol (CLIMARA) 0.05 mg/24 hr 1 Patch by TransDERmal route Every Saturday. 12 Patch 4       ALLERGIES    Allergies   Allergen Reactions    Phenergan [Promethazine] Unknown (comments)    Qsymia [Phentermine-Topiramate] Other (comments)     Vision loss        PHYSICAL EXAMINATION    Visit Vitals  /78   Pulse 94   Temp 97.9 °F (36.6 °C)   Resp 18   Ht 5' 9\" (1.753 m)   Wt 206 lb (93.4 kg)   BMI 30.42 kg/m²     Body mass index is 30.42 kg/m². General: Patient is alert, oriented x 3, not in acute distress    HEENT:   Conjunctiva are not injected and appear moist, there is no alopecia. Cardiovascular:  Heart is regular rate and rhythm, no murmurs. Chest:  Lungs are clear to auscultation bilaterally. Extremities:  Free of clubbing, cyanosis, edema, extremities well perfused. Neurological exam:  Muscle strength is full in upper and lower extremities. Skin exam:  There are  no psoriasis, no active Raynaud's, no livedo reticularis, no periungual erythema. Eexcoriation on bilateral thenar prominences. Musculoskeletal exam:  A comprehensive musculoskeletal exam was performed for all joints of each upper and lower extremity and assessed for swelling, tenderness and range of motion. Right upper extremity allodynia  Right lateral epicondyle tenderness with tenderness along the tendon  No synovitis    DATA REVIEW    Studies Reviewed:     Prior medical records were reviewed and are summarized as below:    Laboratory data: summarized in the HPI    Imaging: summarized in the HPI.       ASSESSMENT AND PLAN    1) Right Lateral Epicondylitis. She follows with orthopedics, Dr. Aneudy Flores who injected her on 9/23/2020. This continues to be an active issue. I defer to him. 2) Low Back Pain. She will be seeing Dr. Massiel Miranda, physical medicine and rehab. I defer to themm    3) Pruritic Rash. I asked her to see dermatology. 4) Foreign Body Sensation. This is intermittent. I have a low index of suspicion for Sjogren's Syndrome. Her serologies were negative. The patient voiced understanding of the aforementioned assessment and plan. Summary of plan was provided in the After Visit Summary patient instructions. I also provided education about MyChart setup and utility. TODAY'S ORDERS    None    No future appointments.     Linda Marmolejo MD, [facility]    Adult Rheumatology   Rheumatology Ultrasound Certified  [address] [facility] [address]  [address] [address]   Phone [phone]  Fax [phone]

## 2020-11-18 DIAGNOSIS — G43.909 MIGRAINE WITHOUT STATUS MIGRAINOSUS, NOT INTRACTABLE, UNSPECIFIED MIGRAINE TYPE: ICD-10-CM

## 2020-11-18 RX ORDER — KETOROLAC TROMETHAMINE 10 MG/1
TABLET, FILM COATED ORAL
Qty: 10 TAB | Refills: 0 | Status: SHIPPED | OUTPATIENT
Start: 2020-11-18 | End: 2021-01-13

## 2020-11-19 DIAGNOSIS — M45.9 ANKYLOSING SPONDYLITIS, UNSPECIFIED SITE OF SPINE (HCC): ICD-10-CM

## 2020-11-19 DIAGNOSIS — M79.7 FIBROMYALGIA: ICD-10-CM

## 2020-11-19 DIAGNOSIS — M54.2 NECK PAIN: ICD-10-CM

## 2020-11-19 RX ORDER — CYCLOBENZAPRINE HCL 5 MG
TABLET ORAL
Qty: 180 TAB | Refills: 1 | Status: SHIPPED | OUTPATIENT
Start: 2020-11-19 | End: 2021-03-21

## 2020-12-14 ENCOUNTER — DOCUMENTATION ONLY (OUTPATIENT)
Dept: FAMILY MEDICINE CLINIC | Age: 45
End: 2020-12-14

## 2020-12-14 DIAGNOSIS — M79.7 FIBROMYALGIA: ICD-10-CM

## 2020-12-14 RX ORDER — CELECOXIB 200 MG/1
CAPSULE ORAL
Qty: 60 CAP | Refills: 2 | Status: SHIPPED | OUTPATIENT
Start: 2020-12-14 | End: 2021-03-17

## 2020-12-14 RX ORDER — GABAPENTIN 400 MG/1
400 CAPSULE ORAL 4 TIMES DAILY
Qty: 120 CAP | Refills: 2 | OUTPATIENT
Start: 2020-12-14

## 2020-12-17 ENCOUNTER — TELEPHONE (OUTPATIENT)
Dept: FAMILY MEDICINE CLINIC | Age: 45
End: 2020-12-17

## 2020-12-17 NOTE — TELEPHONE ENCOUNTER
Patient calling for the second time to discuss getting a VV to request gabapentin. Patient's phone [phone]. Patient has VV 1.6.2021 but is wanting an earlier appointment and also enough gabapentin to last til she is seen.

## 2020-12-23 ENCOUNTER — VIRTUAL VISIT (OUTPATIENT)
Dept: FAMILY MEDICINE CLINIC | Age: 45
End: 2020-12-23
Payer: COMMERCIAL

## 2020-12-23 DIAGNOSIS — M79.7 FIBROMYALGIA: Primary | ICD-10-CM

## 2020-12-23 DIAGNOSIS — M45.9 ANKYLOSING SPONDYLITIS, UNSPECIFIED SITE OF SPINE (HCC): ICD-10-CM

## 2020-12-23 PROCEDURE — 99213 OFFICE O/P EST LOW 20 MIN: CPT | Performed by: NURSE PRACTITIONER

## 2020-12-23 RX ORDER — GABAPENTIN 400 MG/1
400 CAPSULE ORAL 4 TIMES DAILY
Qty: 120 CAP | Refills: 5 | Status: SHIPPED | OUTPATIENT
Start: 2020-12-23 | End: 2021-06-02 | Stop reason: SDUPTHER

## 2020-12-23 NOTE — PROGRESS NOTES
Consent: Erik Moore, who was seen by synchronous (real-time) audio-video technology, and/or her healthcare decision maker, is aware that this patient-initiated, Telehealth encounter on 12/23/2020 is a billable service, with coverage as determined by her insurance carrier. She is aware that she may receive a bill and has provided verbal consent to proceed: Yes. 492      Subjective:   Erik Moore is a 39 y.o. female who was seen for Medication Refill (gabapentin) and Medication Evaluation (stopped buspar states that it was making her nauseous )    Here for refill of gabapentin, takes 400mg QID and reports pain is well controlled on this  Hx of FM and ankylosing spondylitis   Stopped taking Buspar, states it was making her nauseated, reports mood and anxiety are currently stable   Otherwise no concerns     Prior to Admission medications    Medication Sig Start Date End Date Taking? Authorizing Provider   gabapentin (NEURONTIN) 400 mg capsule Take 1 Cap by mouth four (4) times daily. Max Daily Amount: 1,600 mg. 12/23/20  Yes Simon BELL NP   celecoxib (CELEBREX) 200 mg capsule TAKE 1 CAPSULE BY MOUTH TWICE DAILY 12/14/20  Yes Jacklyn Moore NP   cyclobenzaprine (FLEXERIL) 5 mg tablet TAKE 1 TABLET 3 TIMES A DAYAS NEEDED FOR MUSCLE SPASMS 11/19/20  Yes Jacklyn Moore NP   ketorolac (TORADOL) 10 mg tablet TAKE 1 TABLET BY MOUTH EVERY 6 HOURS AS NEEDED FOR PAIN 11/18/20  Yes Jacklyn Moore NP   melatonin 3 mg tablet Take  by mouth daily. Yes Provider, Historical   ALPRAZolam (XANAX) 0.25 mg tablet Take 1 Tab by mouth two (2) times daily as needed for Anxiety. Max Daily Amount: 0.5 mg. 6/16/20  Yes Jacklyn Moore NP   gabapentin (NEURONTIN) 400 mg capsule Take 1 Cap by mouth four (4) times daily.  Max Daily Amount: 1,600 mg. 9/15/20 12/23/20  Jacklyn Moore NP   mometasone (ELOCON) 0.1 % topical cream Apply  to affected area two (2) times daily as needed for Skin Irritation or Itching. 7/8/20   Maki Ramírez NP   busPIRone (BUSPAR) 30 mg tablet TAKE 1 TABLET BY MOUTH TWICE DAILY 6/4/20   Maki Ramírez NP   estradiol (CLIMARA) 0.05 mg/24 hr 1 Patch by TransDERmal route Every Saturday. 11/30/19   Erlinda Perrin MD     Allergies   Allergen Reactions    Phenergan [Promethazine] Unknown (comments)    Qsymia [Phentermine-Topiramate] Other (comments)     Vision loss        Patient Active Problem List    Diagnosis Date Noted    Migraine 03/15/2016    ADHD (attention deficit hyperactivity disorder)     Ankylosing spondylitis (HCC)     Endometriosis     Fibroids     Fibromyalgia        ROS - negative except as listed above in the HPI      Objective:   Vital Signs: (As obtained by patient/caregiver at home)  There were no vitals taken for this visit. Physical Exam:   General: alert, cooperative, no distress   Mental  status: normal mood, behavior, speech, dress, motor activity, and thought processes, able to follow commands   HEENT: normocephalic, atraumatic    Eyes:  extraocular movements intact, sclera normal, no visible discharge   Ears:  external ears normal   Mouth/Throat:  mucous memrates appear moist    Neck: no visualized mass   Resp: respiratory effort is normal, breathing appears non-labored, no visualized signs of respiratory distress   Neuro: no gross deficits   Skin: no discoloration or lesions of concern on visible areas   Psychiatric: normal affect, consistent with stated mood, no evidence of hallucinations      Additional exam findings:      Assessment & Plan:   Diagnoses and all orders for this visit:    1. Fibromyalgia  2. Ankylosing spondylitis, unspecified site of spine (HCC)  -     gabapentin (NEURONTIN) 400 mg capsule; Take 1 Cap by mouth four (4) times daily.  Max Daily Amount: 1,600 mg.  - Stable on current dose, refilled X 6 mo      Controlled Substance Monitoring:    RX Monitoring 12/23/2020   Periodic Controlled Substance Monitoring Possible medication side effects, risk of tolerance/dependence & alternative treatments discussed. ;No signs of potential drug abuse or diversion identified. Follow-up and Dispositions    · Return if symptoms worsen or fail to improve. I spent at least 15 minutes with this established patient, and >50% of the time was spent counseling and/or coordinating care regarding FM      We discussed the expected course, resolution and complications of the diagnosis(es) in detail. Medication risks, benefits, costs, interactions, and alternatives were discussed as indicated. I advised her to contact the office if her condition worsens, changes or fails to improve as anticipated. She expressed understanding with the diagnosis(es) and plan. Anika Quintana is a 39 y.o. female being evaluated by a video visit encounter for concerns as above. A caregiver was present when appropriate. Due to this being a TeleHealth encounter (During [facility] public health emergency), evaluation of the following organ systems was limited: Vitals/Constitutional/EENT/Resp/CV/GI//MS/Neuro/Skin/Heme-Lymph-Imm. Pursuant to the emergency declaration under the [facility] waiver authority and the [employer] Act, this Virtual  Visit was conducted, with patient's (and/or legal guardian's) consent, to reduce the patient's risk of exposure to COVID-19 and provide necessary medical care. Services were provided through a video synchronous discussion virtually to substitute for in-person clinic visit. Patient and provider were located at their individual homes.         Dipesh Balderas NP

## 2020-12-23 NOTE — PROGRESS NOTES
Erik Moore is a 39 y.o. female , id x 2(name and ). Reviewed questionnaires, and  medications.     Chief Complaint   Patient presents with    Medication Refill     gabapentin    Medication Evaluation     stopped buspar states that it was making her nauseous        3 most recent PHQ Screens 2020   PHQ Not Done -   Little interest or pleasure in doing things Not at all   Feeling down, depressed, irritable, or hopeless Not at all   Total Score PHQ 2 0

## 2021-01-13 DIAGNOSIS — G43.909 MIGRAINE WITHOUT STATUS MIGRAINOSUS, NOT INTRACTABLE, UNSPECIFIED MIGRAINE TYPE: ICD-10-CM

## 2021-01-13 RX ORDER — KETOROLAC TROMETHAMINE 10 MG/1
TABLET, FILM COATED ORAL
Qty: 10 TAB | Refills: 0 | Status: SHIPPED | OUTPATIENT
Start: 2021-01-13 | End: 2021-01-14

## 2021-01-14 DIAGNOSIS — G43.909 MIGRAINE WITHOUT STATUS MIGRAINOSUS, NOT INTRACTABLE, UNSPECIFIED MIGRAINE TYPE: ICD-10-CM

## 2021-01-14 RX ORDER — KETOROLAC TROMETHAMINE 10 MG/1
TABLET, FILM COATED ORAL
Qty: 10 TAB | Refills: 0 | Status: SHIPPED | OUTPATIENT
Start: 2021-01-14 | End: 2021-01-25

## 2021-01-23 DIAGNOSIS — G43.909 MIGRAINE WITHOUT STATUS MIGRAINOSUS, NOT INTRACTABLE, UNSPECIFIED MIGRAINE TYPE: ICD-10-CM

## 2021-01-24 DIAGNOSIS — G43.909 MIGRAINE WITHOUT STATUS MIGRAINOSUS, NOT INTRACTABLE, UNSPECIFIED MIGRAINE TYPE: ICD-10-CM

## 2021-01-25 DIAGNOSIS — G43.909 MIGRAINE WITHOUT STATUS MIGRAINOSUS, NOT INTRACTABLE, UNSPECIFIED MIGRAINE TYPE: ICD-10-CM

## 2021-01-25 RX ORDER — KETOROLAC TROMETHAMINE 10 MG/1
TABLET, FILM COATED ORAL
Qty: 10 TAB | Refills: 0 | Status: SHIPPED | OUTPATIENT
Start: 2021-01-25 | End: 2021-01-28

## 2021-01-25 RX ORDER — KETOROLAC TROMETHAMINE 10 MG/1
TABLET, FILM COATED ORAL
Qty: 10 TAB | Refills: 0 | Status: SHIPPED | OUTPATIENT
Start: 2021-01-25 | End: 2021-06-01

## 2021-01-27 NOTE — PROGRESS NOTES
Annual exam ages 40-58    Ermias Beck is a ,  39 y.o. female [facility] No LMP recorded. Patient has had a hysterectomy. , who presents for her annual checkup. PARADISE/BSO for endometriosis  LV=19    Since her last annual GYN exam about one year ago,  she has the following changes in her health history:   - none    ADDITIONAL CONCERNS:  She is having no significant problems. Decreased libido. Has been long-standing issue. Has been on and off ERT including off-label testosterone tx in past.  Was given eRX for Climara patch at last AE. Doesn't think she ever started that. Would like to try again. With regard to the Gardasil vaccine, she is older than the age for which it is FDA approved. Menstrual status:    Her periods are nonexistent in flow. Contraception:    The current method of family planning is status post hysterectomy. Sexual history:     reports being sexually active and has had partner(s) who are Male. She reports using the following method of birth control/protection: Surgical.        Pap and Mammogram History:    Her most recent Pap smear was normal, HPV was neg, obtained 2019. She does not have a history of abnormal pap smears. The patient had her mammogram today in our office. Osteoporosis History:    Family history does not include a first or second degree relative with osteopenia or osteoporosis. A bone density scan has never been done. Past Medical History:   Diagnosis Date    ADHD (attention deficit hyperactivity disorder)     Endometriosis     Fibroids     Fibromyalgia     Hx of mammogram 2019    Negative. No mammographic evidence of malignancy.     Infertility, female    [facility] Screening for malignant neoplasm of the cervix 2019    Negative ; HPV Negative     Past Surgical History:   Procedure Laterality Date    HX APPENDECTOMY      HX COLECTOMY      HX COLONOSCOPY      HX DILATION AND CURETTAGE      HX HYSTEROSCOPY WITH ENDOMETRIAL ABLATION      HX ORTHOPAEDIC  2013    left foot surgery cyst removed    HX PARADISE AND BSO  07    due to endometriosis     OB History    Para Term  AB Living   1 1 1 0 0 1   SAB TAB Ectopic Molar Multiple Live Births   0 0 0   0 1      # Outcome Date GA Lbr Sohail/2nd Weight Sex Delivery Anes PTL Lv   1 Term 05 38w0d 10:00 7 lb 12 oz (3.515 kg) M VAGINAL DELI None  DORINDA       Current Outpatient Medications   Medication Sig Dispense Refill    [START ON 2021] estradioL (CLIMARA) 0.05 mg/24 hr 1 Patch by TransDERmal route Every Saturday. 12 Patch 4    ketorolac (TORADOL) 10 mg tablet TAKE 1 TABLET BY MOUTH EVERY 6 HOURS AS NEEDED FOR PAIN 10 Tab 0    gabapentin (NEURONTIN) 400 mg capsule Take 1 Cap by mouth four (4) times daily. Max Daily Amount: 1,600 mg. 120 Cap 5    celecoxib (CELEBREX) 200 mg capsule TAKE 1 CAPSULE BY MOUTH TWICE DAILY 60 Cap 2    cyclobenzaprine (FLEXERIL) 5 mg tablet TAKE 1 TABLET 3 TIMES A DAYAS NEEDED FOR MUSCLE SPASMS 180 Tab 1    melatonin 3 mg tablet Take  by mouth daily.  ALPRAZolam (XANAX) 0.25 mg tablet Take 1 Tab by mouth two (2) times daily as needed for Anxiety.  Max Daily Amount: 0.5 mg. 10 Tab 0     Allergies: Phenergan [promethazine] and Qsymia [phentermine-topiramate]   Social History     Socioeconomic History    Marital status:      Spouse name: Not on file    Number of children: Not on file    Years of education: Not on file    Highest education level: Not on file   Occupational History    Not on file   Social Needs    Financial resource strain: Not on file    Food insecurity     Worry: Not on file     Inability: Not on file    Transportation needs     Medical: Not on file     Non-medical: Not on file   Tobacco Use    Smoking status: Former Smoker     Quit date: 2017     Years since quitting: 3.5    Smokeless tobacco: Never Used    Tobacco comment: Vapes currently   Substance and Sexual Activity    Alcohol use: No     Alcohol/week: 0.0 standard drinks    Drug use: No    Sexual activity: Yes     Partners: Male     Birth control/protection: Surgical   Lifestyle    Physical activity     Days per week: Not on file     Minutes per session: Not on file    Stress: Not on file   Relationships    Social connections     Talks on phone: Not on file     Gets together: Not on file     Attends Christianity service: Not on file     Active member of club or organization: Not on file     Attends meetings of clubs or organizations: Not on file     Relationship status: Not on file    Intimate partner violence     Fear of current or ex partner: Not on file     Emotionally abused: Not on file     Physically abused: Not on file     Forced sexual activity: Not on file   Other Topics Concern    Not on file   Social History Narrative    Not on file     Tobacco History:  reports that she quit smoking about 3 years ago. She has never used smokeless tobacco.  Alcohol Abuse:  reports no history of alcohol use. Drug Abuse:  reports no history of drug use.     Patient Active Problem List   Diagnosis Code    ADHD (attention deficit hyperactivity disorder) F90.9    Ankylosing spondylitis ([facility] [address] M45.9    Endometriosis N80.9    Fibroids D21.9    Fibromyalgia M79.7    Migraine G43.909     Family History   Problem Relation Age of Onset    Alcohol abuse Other     Arthritis-rheumatoid Other     Stroke Other     Cancer Other         bladder and tongue    Stroke Maternal Grandmother        Review of Systems - History obtained from the patient  Constitutional: negative for weight loss, fever, night sweats  HEENT: negative for hearing loss, earache, congestion, snoring, sorethroat  CV: negative for chest pain, palpitations, edema  Resp: negative for cough, shortness of breath, wheezing  GI: negative for change in bowel habits, abdominal pain, black or bloody stools  : negative for frequency, dysuria, hematuria, vaginal discharge  MSK: negative for back pain, joint pain, muscle pain  Breast: negative for breast lumps, nipple discharge, galactorrhea  Skin :negative for itching, rash, hives  Neuro: negative for dizziness, headache, confusion, weakness  Psych: negative for anxiety, depression, change in mood  Heme/lymph: negative for bleeding, bruising, pallor    Physical Exam    Visit Vitals  /60   Wt 210 lb (95.3 kg)   BMI 31.01 kg/m²       Constitutional  · Appearance: well-nourished, well developed, alert, in no acute distress    HENT  · Head and Face: appears normal    Neck  · Inspection/Palpation: normal appearance, no masses or tenderness  · Lymph Nodes: no lymphadenopathy present  · Thyroid: gland size normal, nontender, no nodules or masses present on palpation    Chest  · Respiratory Effort: breathing unlabored  · Auscultation: normal breath sounds    Cardiovascular  · Heart:  · Auscultation: regular rate and rhythm without murmur    Breasts  · Inspection of Breasts: breasts symmetrical, no skin changes, no discharge present, nipple appearance normal, no skin retraction present  · Palpation of Breasts and Axillae: no masses present on palpation, no breast tenderness  · Axillary Lymph Nodes: no lymphadenopathy present    Gastrointestinal  · Abdominal Examination: abdomen non-tender to palpation, normal bowel sounds, no masses present  · Liver and spleen: no hepatomegaly present, spleen not palpable  · Hernias: no hernias identified    Genitourinary  · External Genitalia: fairly significant atrophy otherwise normal appearance for age, no discharge present, no tenderness present, no inflammatory lesions present, no masses present  · Vagina: normal vaginal vault without central or paravaginal defects, no discharge present, no inflammatory lesions present, no masses present  · Bladder: non-tender to palpation  · Urethra: appears normal  · Cervix: absent   · Uterusabsent  · Adnexa: no adnexal tenderness present, no adnexal masses present  · Perineum: perineum within normal limits, no evidence of trauma, no rashes or skin lesions present  · Anus: anus within normal limits, no hemorrhoids present  · Inguinal Lymph Nodes: no lymphadenopathy present    Skin  · General Inspection: no rash, no lesions identified    Neurologic/Psychiatric  · Mental Status:  · Orientation: grossly oriented to person, place and time  · Mood and Affect: mood normal, affect appropriate    Results for orders placed or performed during the hospital encounter of 21   NIR MAMMO BI SCREENING INCL CAD    Narrative    STUDY: Bilateral digital screening mammogram    INDICATION:  Screening. COMPARISON: [id number]    BREAST COMPOSITION:  There are scattered areas of fibroglandular density. FINDINGS: Bilateral digital screening mammography was performed and is  interpreted in conjunction with a computer assisted detection (CAD) system. No  suspicious masses or calcifications are identified. There has been no  significant change. Impression    BI-RADS 1: Negative. No mammographic evidence of malignancy. RECOMMENDATIONS:  Next screening mammogram is recommended in one year. The patient will be notified of these results. Assessment & Plan:  · Routine gynecologic examination. · H/o PARADISE/BSO for endometriosis. Nl paps -> exit routine screening. · Decrease libido -> eRX Climara patch  · Counseled re: diet, exercise, healthy lifestyle  · Return for yearly wellness visits  · Rec annual mammogram.  BR-1 today. · Patient verbalized understanding           Orders Placed This Encounter    estradioL (CLIMARA) 0.05 mg/24 hr     Si Patch by TransDERmal route Every Saturday.      Dispense:  12 Patch     Refill:  4

## 2021-01-28 ENCOUNTER — OFFICE VISIT (OUTPATIENT)
Dept: OBGYN CLINIC | Age: 46
End: 2021-01-28
Payer: COMMERCIAL

## 2021-01-28 VITALS — SYSTOLIC BLOOD PRESSURE: 124 MMHG | BODY MASS INDEX: 31.01 KG/M2 | WEIGHT: 210 LBS | DIASTOLIC BLOOD PRESSURE: 60 MMHG

## 2021-01-28 DIAGNOSIS — Z01.419 ENCOUNTER FOR WELL WOMAN EXAM WITH ROUTINE GYNECOLOGICAL EXAM: Primary | ICD-10-CM

## 2021-01-28 DIAGNOSIS — N95.1 MENOPAUSAL STATE: ICD-10-CM

## 2021-01-28 DIAGNOSIS — R68.82 DECREASED LIBIDO: ICD-10-CM

## 2021-01-28 PROCEDURE — 77067 SCR MAMMO BI INCL CAD: CPT | Performed by: OBSTETRICS & GYNECOLOGY

## 2021-01-28 PROCEDURE — 77063 BREAST TOMOSYNTHESIS BI: CPT | Performed by: OBSTETRICS & GYNECOLOGY

## 2021-01-28 PROCEDURE — 99396 PREV VISIT EST AGE 40-64: CPT | Performed by: OBSTETRICS & GYNECOLOGY

## 2021-01-28 RX ORDER — ESTRADIOL 0.05 MG/D
1 PATCH TRANSDERMAL
Qty: 12 PATCH | Refills: 4 | Status: SHIPPED | OUTPATIENT
Start: 2021-01-30 | End: 2022-04-26

## 2021-02-21 DIAGNOSIS — F41.9 ANXIETY: ICD-10-CM

## 2021-02-21 RX ORDER — ALPRAZOLAM 0.25 MG/1
0.25 TABLET ORAL
Qty: 10 TAB | Refills: 0 | Status: SHIPPED | OUTPATIENT
Start: 2021-02-21 | End: 2021-05-02 | Stop reason: SDUPTHER

## 2021-03-17 RX ORDER — CELECOXIB 200 MG/1
CAPSULE ORAL
Qty: 60 CAP | Refills: 2 | Status: SHIPPED | OUTPATIENT
Start: 2021-03-17 | End: 2021-06-02 | Stop reason: SDUPTHER

## 2021-03-19 DIAGNOSIS — M54.2 NECK PAIN: ICD-10-CM

## 2021-03-19 DIAGNOSIS — M79.7 FIBROMYALGIA: ICD-10-CM

## 2021-03-19 DIAGNOSIS — M45.9 ANKYLOSING SPONDYLITIS, UNSPECIFIED SITE OF SPINE (HCC): ICD-10-CM

## 2021-03-21 RX ORDER — CYCLOBENZAPRINE HCL 5 MG
TABLET ORAL
Qty: 180 TAB | Refills: 1 | Status: SHIPPED | OUTPATIENT
Start: 2021-03-21 | End: 2021-07-17

## 2021-05-02 DIAGNOSIS — F41.9 ANXIETY: ICD-10-CM

## 2021-05-02 RX ORDER — ALPRAZOLAM 0.25 MG/1
0.25 TABLET ORAL
Qty: 10 TAB | Refills: 0 | Status: SHIPPED | OUTPATIENT
Start: 2021-05-02 | End: 2021-06-02 | Stop reason: SDUPTHER

## 2021-06-01 DIAGNOSIS — G43.909 MIGRAINE WITHOUT STATUS MIGRAINOSUS, NOT INTRACTABLE, UNSPECIFIED MIGRAINE TYPE: ICD-10-CM

## 2021-06-01 RX ORDER — KETOROLAC TROMETHAMINE 10 MG/1
TABLET, FILM COATED ORAL
Qty: 10 TABLET | Refills: 0 | Status: SHIPPED | OUTPATIENT
Start: 2021-06-01 | End: 2021-06-03

## 2021-06-01 RX ORDER — KETOROLAC TROMETHAMINE 10 MG/1
TABLET, FILM COATED ORAL
Qty: 10 TABLET | Refills: 0 | Status: SHIPPED | OUTPATIENT
Start: 2021-06-01 | End: 2021-12-01 | Stop reason: SDUPTHER

## 2021-06-02 ENCOUNTER — VIRTUAL VISIT (OUTPATIENT)
Dept: FAMILY MEDICINE CLINIC | Age: 46
End: 2021-06-02
Payer: COMMERCIAL

## 2021-06-02 DIAGNOSIS — M79.7 FIBROMYALGIA: ICD-10-CM

## 2021-06-02 DIAGNOSIS — F41.9 ANXIETY: ICD-10-CM

## 2021-06-02 DIAGNOSIS — M45.9 ANKYLOSING SPONDYLITIS, UNSPECIFIED SITE OF SPINE (HCC): ICD-10-CM

## 2021-06-02 PROCEDURE — 99213 OFFICE O/P EST LOW 20 MIN: CPT | Performed by: NURSE PRACTITIONER

## 2021-06-02 RX ORDER — GABAPENTIN 400 MG/1
400 CAPSULE ORAL 4 TIMES DAILY
Qty: 120 CAPSULE | Refills: 5 | Status: SHIPPED | OUTPATIENT
Start: 2021-06-02 | End: 2021-12-22

## 2021-06-02 RX ORDER — ALPRAZOLAM 0.25 MG/1
0.25 TABLET ORAL
Qty: 10 TABLET | Refills: 1 | Status: SHIPPED | OUTPATIENT
Start: 2021-06-02 | End: 2021-09-27

## 2021-06-02 RX ORDER — CELECOXIB 200 MG/1
CAPSULE ORAL
Qty: 60 CAPSULE | Refills: 5 | Status: SHIPPED | OUTPATIENT
Start: 2021-06-02 | End: 2021-06-15

## 2021-06-02 NOTE — PROGRESS NOTES
Chief Complaint   Patient presents with    Medication Refill     Pt being seen for med refill    1. Have you been to the ER, urgent care clinic since your last visit? Hospitalized since your last visit? No    2. Have you seen or consulted any other health care providers outside of the [address] [place] [address] since your last visit? Include any pap smears or colon screening.  No     Pt has no other concerns

## 2021-06-02 NOTE — PROGRESS NOTES
Porter Barakat (: 1975) is a 55 y.o. female, established patient, here for evaluation of the following chief complaint(s):   Medication Refill       SUBJECTIVE/OBJECTIVE:  HPI  Patient is due for refill of her meds for fibro and anxiety  Mood and med regimen is good, pain well controlled  No adr/se of med    Review of Systems   A comprehensive review of system was obtained and negative except findings in the HPI    Patient-Reported Vitals 2020   Patient-Reported Weight 207   Patient-Reported Height 59   Patient-Reported Temperature 97.7   Patient-Reported SpO2 99   Patient-Reported Systolic  455   Patient-Reported Diastolic 80       Physical Exam    [INSTRUCTIONS:  \"[x]\" Indicates a positive item  \"[]\" Indicates a negative item  -- DELETE ALL ITEMS NOT EXAMINED]    Constitutional: [x] Appears well-developed and well-nourished [x] No apparent distress      [] Abnormal -     Mental status: [x] Alert and awake  [x] Oriented to person/place/time [x] Able to follow commands    [] Abnormal -     Eyes:   EOM    [x]  Normal    [] Abnormal -   Sclera  [x]  Normal    [] Abnormal -          Discharge [x]  None visible   [] Abnormal -     HENT: [x] Normocephalic, atraumatic  [] Abnormal -   [x] Mouth/Throat: Mucous membranes are moist    External Ears [x] Normal  [] Abnormal -    Neck: [x] No visualized mass [] Abnormal -     Pulmonary/Chest: [x] Respiratory effort normal   [x] No visualized signs of difficulty breathing or respiratory distress        [] Abnormal -      Musculoskeletal:   [x] Normal gait with no signs of ataxia         [x] Normal range of motion of neck        [] Abnormal -     Neurological:        [x] No Facial Asymmetry (Cranial nerve 7 motor function) (limited exam due to video visit)          [x] No gaze palsy        [] Abnormal -          Skin:        [x] No significant exanthematous lesions or discoloration noted on facial skin         [] Abnormal -            Psychiatric:       [x] Normal Affect [] Abnormal -        [x] No Hallucinations    Other pertinent observable physical exam findings:- none    Diagnoses and all orders for this visit:    1. Fibromyalgia  -     gabapentin (NEURONTIN) 400 mg capsule; Take 1 Capsule by mouth four (4) times daily. Max Daily Amount: 1,600 mg.    2. Ankylosing spondylitis, unspecified site of spine (HCC)  -     gabapentin (NEURONTIN) 400 mg capsule; Take 1 Capsule by mouth four (4) times daily. Max Daily Amount: 1,600 mg.    3. Anxiety  -     ALPRAZolam (XANAX) 0.25 mg tablet; Take 1 Tablet by mouth two (2) times daily as needed for Anxiety. Max Daily Amount: 0.5 mg. Other orders  -     celecoxib (CELEBREX) 200 mg capsule; TAKE 1 CAPSULE BY MOUTH TWICE DAILY      Refills updated today  Follow up 6 mo        On this date 06/02/2021 I have spent 15 minutes reviewing previous notes, test results and face to face (virtual) with the patient discussing the diagnosis and importance of compliance with the treatment plan as well as documenting on the day of the visit. Doris Burris, was evaluated through a synchronous (real-time) audio-video encounter. The patient (or guardian if applicable) is aware that this is a billable service. Verbal consent to proceed has been obtained within the past 12 months. The visit was conducted pursuant to the emergency declaration under the [address] [place] [address], [address] [place] [address] authority and the [employer] and [employer] General Act. Patient identification was verified, and a caregiver was present when appropriate. The patient was located in a state where the provider was credentialed to provide care. An electronic signature was used to authenticate this note.   -- Nhung Wallis NP

## 2021-06-03 DIAGNOSIS — G43.909 MIGRAINE WITHOUT STATUS MIGRAINOSUS, NOT INTRACTABLE, UNSPECIFIED MIGRAINE TYPE: ICD-10-CM

## 2021-06-03 RX ORDER — KETOROLAC TROMETHAMINE 10 MG/1
TABLET, FILM COATED ORAL
Qty: 10 TABLET | Refills: 0 | Status: SHIPPED | OUTPATIENT
Start: 2021-06-03 | End: 2021-06-06

## 2021-06-06 DIAGNOSIS — G43.909 MIGRAINE WITHOUT STATUS MIGRAINOSUS, NOT INTRACTABLE, UNSPECIFIED MIGRAINE TYPE: ICD-10-CM

## 2021-06-06 RX ORDER — KETOROLAC TROMETHAMINE 10 MG/1
TABLET, FILM COATED ORAL
Qty: 10 TABLET | Refills: 0 | Status: SHIPPED | OUTPATIENT
Start: 2021-06-06 | End: 2021-12-01 | Stop reason: SDUPTHER

## 2021-06-15 RX ORDER — CELECOXIB 200 MG/1
CAPSULE ORAL
Qty: 60 CAPSULE | Refills: 5 | Status: SHIPPED | OUTPATIENT
Start: 2021-06-15 | End: 2022-01-16

## 2021-06-26 RX ORDER — ESCITALOPRAM OXALATE 10 MG/1
10 TABLET ORAL DAILY
Qty: 90 TABLET | Refills: 3 | Status: SHIPPED | OUTPATIENT
Start: 2021-06-26 | End: 2021-09-07

## 2021-07-17 DIAGNOSIS — M54.2 NECK PAIN: ICD-10-CM

## 2021-07-17 DIAGNOSIS — M45.9 ANKYLOSING SPONDYLITIS, UNSPECIFIED SITE OF SPINE (HCC): ICD-10-CM

## 2021-07-17 DIAGNOSIS — M79.7 FIBROMYALGIA: ICD-10-CM

## 2021-07-17 RX ORDER — CYCLOBENZAPRINE HCL 5 MG
TABLET ORAL
Qty: 180 TABLET | Refills: 1 | Status: SHIPPED | OUTPATIENT
Start: 2021-07-17 | End: 2021-11-11

## 2021-09-07 RX ORDER — ESCITALOPRAM OXALATE 10 MG/1
15 TABLET ORAL DAILY
Qty: 135 TABLET | Refills: 3 | Status: SHIPPED | OUTPATIENT
Start: 2021-09-07 | End: 2022-08-19 | Stop reason: SDUPTHER

## 2021-09-21 ENCOUNTER — DOCUMENTATION ONLY (OUTPATIENT)
Dept: FAMILY MEDICINE CLINIC | Age: 46
End: 2021-09-21

## 2021-10-05 ENCOUNTER — DOCUMENTATION ONLY (OUTPATIENT)
Dept: FAMILY MEDICINE CLINIC | Age: 46
End: 2021-10-05

## 2021-10-17 DIAGNOSIS — G43.909 MIGRAINE WITHOUT STATUS MIGRAINOSUS, NOT INTRACTABLE, UNSPECIFIED MIGRAINE TYPE: ICD-10-CM

## 2021-10-17 RX ORDER — KETOROLAC TROMETHAMINE 10 MG/1
TABLET, FILM COATED ORAL
Qty: 10 TABLET | Refills: 0 | Status: SHIPPED | OUTPATIENT
Start: 2021-10-17 | End: 2021-12-01 | Stop reason: SDUPTHER

## 2021-10-31 DIAGNOSIS — G43.909 MIGRAINE WITHOUT STATUS MIGRAINOSUS, NOT INTRACTABLE, UNSPECIFIED MIGRAINE TYPE: ICD-10-CM

## 2021-11-01 DIAGNOSIS — G43.909 MIGRAINE WITHOUT STATUS MIGRAINOSUS, NOT INTRACTABLE, UNSPECIFIED MIGRAINE TYPE: ICD-10-CM

## 2021-11-01 RX ORDER — KETOROLAC TROMETHAMINE 10 MG/1
TABLET, FILM COATED ORAL
Qty: 10 TABLET | Refills: 0 | Status: SHIPPED | OUTPATIENT
Start: 2021-11-01 | End: 2021-11-01

## 2021-11-01 RX ORDER — KETOROLAC TROMETHAMINE 10 MG/1
TABLET, FILM COATED ORAL
Qty: 10 TABLET | Refills: 0 | Status: SHIPPED | OUTPATIENT
Start: 2021-11-01 | End: 2021-11-29

## 2021-11-03 ENCOUNTER — VIRTUAL VISIT (OUTPATIENT)
Dept: FAMILY MEDICINE CLINIC | Age: 46
End: 2021-11-03
Payer: COMMERCIAL

## 2021-11-03 DIAGNOSIS — M79.7 FIBROMYALGIA: ICD-10-CM

## 2021-11-03 DIAGNOSIS — F41.9 ANXIETY: Primary | ICD-10-CM

## 2021-11-03 PROCEDURE — 99213 OFFICE O/P EST LOW 20 MIN: CPT | Performed by: NURSE PRACTITIONER

## 2021-11-03 RX ORDER — ALPRAZOLAM 0.25 MG/1
TABLET ORAL
Qty: 30 TABLET | Refills: 1 | Status: SHIPPED | OUTPATIENT
Start: 2021-11-03 | End: 2022-01-02

## 2021-11-03 NOTE — PROGRESS NOTES
Chief Complaint   Patient presents with    Medication Refill     Pt being seen for med refill    1. Have you been to the ER, urgent care clinic since your last visit? Hospitalized since your last visit? No    2. Have you seen or consulted any other health care providers outside of the [address] [place] [address] since your last visit? Include any pap smears or colon screening.  No     Pt has no other concerns

## 2021-11-03 NOTE — PROGRESS NOTES
Harsh Marshall (: 1975) is a 55 y.o. female, established patient, here for evaluation of the following chief complaint(s):   Medication Refill       ASSESSMENT/PLAN:  Below is the assessment and plan developed based on review of pertinent history, labs, studies, and medications. 1. Anxiety  -     ALPRAZolam (XANAX) 0.25 mg tablet; TAKE 1 TABLET BY MOUTH TWICE DAILY AS NEEDED FOR ANXIETY. MAX DAILY AMOUNT: 0.5 MG - must last 30 days, Normal, Disp-30 Tablet, R-1  2. Fibromyalgia    No changes in meds at this time  Refills updated for xanax  Follow up prn    No follow-ups on file.     SUBJECTIVE/OBJECTIVE:  HPI  Patient due for follow up anxiety  Taking 1/2 xanax not every day  1 1/2 lexapro is doing well too and mood is overall stable  Likes the combo of gabapentin and celebrex, - good chronic pain control    Review of Systems   A comprehensive review of system was obtained and negative except findings in the HPI    No data recorded     Physical Exam    [INSTRUCTIONS:  \"[x]\" Indicates a positive item  \"[]\" Indicates a negative item  -- DELETE ALL ITEMS NOT EXAMINED]    Constitutional: [x] Appears well-developed and well-nourished [x] No apparent distress      [] Abnormal -     Mental status: [x] Alert and awake  [x] Oriented to person/place/time [x] Able to follow commands    [] Abnormal -     Eyes:   EOM    [x]  Normal    [] Abnormal -   Sclera  [x]  Normal    [] Abnormal -          Discharge [x]  None visible   [] Abnormal -     HENT: [x] Normocephalic, atraumatic  [] Abnormal -   [x] Mouth/Throat: Mucous membranes are moist    External Ears [x] Normal  [] Abnormal -    Neck: [x] No visualized mass [] Abnormal -     Pulmonary/Chest: [x] Respiratory effort normal   [x] No visualized signs of difficulty breathing or respiratory distress        [] Abnormal -      Musculoskeletal:   [x] Normal gait with no signs of ataxia         [x] Normal range of motion of neck        [] Abnormal -     Neurological: [x] No Facial Asymmetry (Cranial nerve 7 motor function) (limited exam due to video visit)          [x] No gaze palsy        [] Abnormal -          Skin:        [x] No significant exanthematous lesions or discoloration noted on facial skin         [] Abnormal -            Psychiatric:       [x] Normal Affect [] Abnormal -        [x] No Hallucinations    Other pertinent observable physical exam findings:- none        On this date 11/03/2021 I have spent 15 minutes reviewing previous notes, test results and face to face (virtual) with the patient discussing the diagnosis and importance of compliance with the treatment plan as well as documenting on the day of the visit. Mason De Luna, was evaluated through a synchronous (real-time) audio-video encounter. The patient (or guardian if applicable) is aware that this is a billable service. Verbal consent to proceed has been obtained within the past 12 months. The visit was conducted pursuant to the emergency declaration under the [address] [place] [address], [address] [place] [address] authority and the [employer] and [email or web address] General Act. Patient identification was verified, and a caregiver was present when appropriate. The patient was located in a state where the provider was credentialed to provide care. An electronic signature was used to authenticate this note.   -- Cyn Alvarez, MARKIE

## 2021-11-11 DIAGNOSIS — M45.9 ANKYLOSING SPONDYLITIS, UNSPECIFIED SITE OF SPINE (HCC): ICD-10-CM

## 2021-11-11 DIAGNOSIS — M79.7 FIBROMYALGIA: ICD-10-CM

## 2021-11-11 DIAGNOSIS — M54.2 NECK PAIN: ICD-10-CM

## 2021-11-11 RX ORDER — CYCLOBENZAPRINE HCL 5 MG
TABLET ORAL
Qty: 180 TABLET | Refills: 1 | Status: SHIPPED | OUTPATIENT
Start: 2021-11-11 | End: 2022-03-07

## 2021-11-15 RX ORDER — AZITHROMYCIN 250 MG/1
TABLET, FILM COATED ORAL
Qty: 6 TABLET | Refills: 0 | Status: SHIPPED | OUTPATIENT
Start: 2021-11-15 | End: 2021-11-20

## 2021-11-29 DIAGNOSIS — G43.909 MIGRAINE WITHOUT STATUS MIGRAINOSUS, NOT INTRACTABLE, UNSPECIFIED MIGRAINE TYPE: ICD-10-CM

## 2021-11-29 RX ORDER — KETOROLAC TROMETHAMINE 10 MG/1
TABLET, FILM COATED ORAL
Qty: 10 TABLET | Refills: 0 | Status: SHIPPED | OUTPATIENT
Start: 2021-11-29 | End: 2021-12-29

## 2021-12-22 DIAGNOSIS — M45.9 ANKYLOSING SPONDYLITIS, UNSPECIFIED SITE OF SPINE (HCC): ICD-10-CM

## 2021-12-22 DIAGNOSIS — M79.7 FIBROMYALGIA: ICD-10-CM

## 2021-12-22 RX ORDER — GABAPENTIN 400 MG/1
CAPSULE ORAL
Qty: 120 CAPSULE | Refills: 1 | Status: SHIPPED | OUTPATIENT
Start: 2021-12-22 | End: 2022-02-27 | Stop reason: SDUPTHER

## 2021-12-29 DIAGNOSIS — G43.909 MIGRAINE WITHOUT STATUS MIGRAINOSUS, NOT INTRACTABLE, UNSPECIFIED MIGRAINE TYPE: ICD-10-CM

## 2021-12-29 RX ORDER — KETOROLAC TROMETHAMINE 10 MG/1
TABLET, FILM COATED ORAL
Qty: 10 TABLET | Refills: 0 | Status: SHIPPED | OUTPATIENT
Start: 2021-12-29 | End: 2022-02-17

## 2022-01-01 DIAGNOSIS — F41.9 ANXIETY: ICD-10-CM

## 2022-01-02 RX ORDER — ALPRAZOLAM 0.25 MG/1
0.25 TABLET ORAL
Qty: 30 TABLET | Refills: 0 | Status: SHIPPED | OUTPATIENT
Start: 2022-01-02 | End: 2022-02-02 | Stop reason: SDUPTHER

## 2022-01-16 RX ORDER — CELECOXIB 200 MG/1
CAPSULE ORAL
Qty: 60 CAPSULE | Refills: 5 | Status: SHIPPED | OUTPATIENT
Start: 2022-01-16 | End: 2022-08-01

## 2022-02-02 ENCOUNTER — VIRTUAL VISIT (OUTPATIENT)
Dept: FAMILY MEDICINE CLINIC | Age: 47
End: 2022-02-02
Payer: COMMERCIAL

## 2022-02-02 DIAGNOSIS — M45.9 ANKYLOSING SPONDYLITIS, UNSPECIFIED SITE OF SPINE (HCC): ICD-10-CM

## 2022-02-02 DIAGNOSIS — F41.9 ANXIETY: Primary | ICD-10-CM

## 2022-02-02 PROCEDURE — 99213 OFFICE O/P EST LOW 20 MIN: CPT | Performed by: NURSE PRACTITIONER

## 2022-02-02 RX ORDER — ALPRAZOLAM 0.25 MG/1
0.25 TABLET ORAL
Qty: 30 TABLET | Refills: 2 | Status: SHIPPED | OUTPATIENT
Start: 2022-02-02 | End: 2022-04-26 | Stop reason: SDUPTHER

## 2022-02-02 NOTE — PROGRESS NOTES
Ian Black (: 1975) is a 55 y.o. female, established patient, here for evaluation of the following chief complaint(s):   Medication Refill       ASSESSMENT/PLAN:  Below is the assessment and plan developed based on review of pertinent history, labs, studies, and medications. 1. Anxiety  -     ALPRAZolam (XANAX) 0.25 mg tablet; Take 1 Tablet by mouth two (2) times daily as needed for Anxiety. Max Daily Amount: 0.5 mg. TAKE 1 TABLET BY MOUTH TWICE DAILY AS NEEDED FOR ANXIETY. MAX DAILY AMOUNT: 0.5 MG-, Normal, Disp-30 Tablet, R-2  2. Ankylosing spondylitis, unspecified site of spine [facility])  Assessment & Plan:   well controlled, continue current medications, dx by Dr. Kessler Has, now taken care of by this practice    Refills updated today  Follow up 3 mo    No follow-ups on file.     SUBJECTIVE/OBJECTIVE:  HPI  Due for refills of xanax  Takes nightly and prn  No early refill requests noted    Review of Systems     No data recorded     Physical Exam    [INSTRUCTIONS:  \"[x]\" Indicates a positive item  \"[]\" Indicates a negative item  -- DELETE ALL ITEMS NOT EXAMINED]    Constitutional: [x] Appears well-developed and well-nourished [x] No apparent distress      [] Abnormal -     Mental status: [x] Alert and awake  [x] Oriented to person/place/time [x] Able to follow commands    [] Abnormal -     Eyes:   EOM    [x]  Normal    [] Abnormal -   Sclera  [x]  Normal    [] Abnormal -          Discharge [x]  None visible   [] Abnormal -     HENT: [x] Normocephalic, atraumatic  [] Abnormal -   [x] Mouth/Throat: Mucous membranes are moist    External Ears [x] Normal  [] Abnormal -    Neck: [x] No visualized mass [] Abnormal -     Pulmonary/Chest: [x] Respiratory effort normal   [x] No visualized signs of difficulty breathing or respiratory distress        [] Abnormal -      Musculoskeletal:   [x] Normal gait with no signs of ataxia         [x] Normal range of motion of neck        [] Abnormal - Neurological:        [x] No Facial Asymmetry (Cranial nerve 7 motor function) (limited exam due to video visit)          [x] No gaze palsy        [] Abnormal -          Skin:        [x] No significant exanthematous lesions or discoloration noted on facial skin         [] Abnormal -            Psychiatric:       [x] Normal Affect [] Abnormal -        [x] No Hallucinations    Other pertinent observable physical exam findings:-        On this date 02/02/2022 I have spent 15 minutes reviewing previous notes, test results and face to face (virtual) with the patient discussing the diagnosis and importance of compliance with the treatment plan as well as documenting on the day of the visit. Danica Wilson, was evaluated through a synchronous (real-time) audio-video encounter. The patient (or guardian if applicable) is aware that this is a billable service, which includes applicable co-pays. Verbal consent to proceed has been obtained. The visit was conducted pursuant to the emergency declaration under the [facility], [address] [place] [address] authority and the [employer] and [employer] General Act. Patient identification was verified, and a caregiver was present when appropriate. The patient was located at home in a state where the provider was licensed to provide care. An electronic signature was used to authenticate this note.   -- Ayo Isaac NP

## 2022-02-02 NOTE — ASSESSMENT & PLAN NOTE
well controlled, continue current medications, dx by Dr. Melodie Lynch, now taken care of by this practice

## 2022-02-02 NOTE — PROGRESS NOTES
Chief Complaint   Patient presents with    Medication Refill     Pt being seen for med refill    1. Have you been to the ER, urgent care clinic since your last visit? Hospitalized since your last visit? No    2. Have you seen or consulted any other health care providers outside of the [address] [place] [address] since your last visit? Include any pap smears or colon screening.  No     Pt has no other concerns

## 2022-02-17 DIAGNOSIS — G43.909 MIGRAINE WITHOUT STATUS MIGRAINOSUS, NOT INTRACTABLE, UNSPECIFIED MIGRAINE TYPE: ICD-10-CM

## 2022-02-17 RX ORDER — KETOROLAC TROMETHAMINE 10 MG/1
TABLET, FILM COATED ORAL
Qty: 10 TABLET | Refills: 0 | Status: SHIPPED | OUTPATIENT
Start: 2022-02-17 | End: 2022-03-18 | Stop reason: SDUPTHER

## 2022-02-27 DIAGNOSIS — M79.7 FIBROMYALGIA: ICD-10-CM

## 2022-02-27 DIAGNOSIS — M45.9 ANKYLOSING SPONDYLITIS, UNSPECIFIED SITE OF SPINE (HCC): ICD-10-CM

## 2022-02-27 RX ORDER — GABAPENTIN 400 MG/1
400 CAPSULE ORAL 4 TIMES DAILY
Qty: 120 CAPSULE | Refills: 1 | Status: SHIPPED | OUTPATIENT
Start: 2022-02-27 | End: 2022-04-22

## 2022-03-07 DIAGNOSIS — M79.7 FIBROMYALGIA: ICD-10-CM

## 2022-03-07 DIAGNOSIS — M54.2 NECK PAIN: ICD-10-CM

## 2022-03-07 DIAGNOSIS — M45.9 ANKYLOSING SPONDYLITIS, UNSPECIFIED SITE OF SPINE (HCC): ICD-10-CM

## 2022-03-07 RX ORDER — CYCLOBENZAPRINE HCL 5 MG
TABLET ORAL
Qty: 180 TABLET | Refills: 1 | Status: SHIPPED | OUTPATIENT
Start: 2022-03-07 | End: 2022-03-22 | Stop reason: ALTCHOICE

## 2022-03-18 DIAGNOSIS — G43.909 MIGRAINE WITHOUT STATUS MIGRAINOSUS, NOT INTRACTABLE, UNSPECIFIED MIGRAINE TYPE: ICD-10-CM

## 2022-03-20 RX ORDER — KETOROLAC TROMETHAMINE 10 MG/1
10 TABLET, FILM COATED ORAL
Qty: 10 TABLET | Refills: 0 | Status: SHIPPED | OUTPATIENT
Start: 2022-03-20 | End: 2022-05-03

## 2022-03-22 RX ORDER — METHOCARBAMOL 750 MG/1
750 TABLET, FILM COATED ORAL
Qty: 90 TABLET | Refills: 1 | Status: SHIPPED | OUTPATIENT
Start: 2022-03-22 | End: 2022-05-16

## 2022-04-22 DIAGNOSIS — M79.7 FIBROMYALGIA: ICD-10-CM

## 2022-04-22 DIAGNOSIS — M45.9 ANKYLOSING SPONDYLITIS, UNSPECIFIED SITE OF SPINE (HCC): ICD-10-CM

## 2022-04-22 RX ORDER — GABAPENTIN 400 MG/1
CAPSULE ORAL
Qty: 120 CAPSULE | Refills: 1 | Status: SHIPPED | OUTPATIENT
Start: 2022-04-22 | End: 2022-06-13

## 2022-04-26 ENCOUNTER — VIRTUAL VISIT (OUTPATIENT)
Dept: FAMILY MEDICINE CLINIC | Age: 47
End: 2022-04-26
Payer: COMMERCIAL

## 2022-04-26 DIAGNOSIS — F41.9 ANXIETY: ICD-10-CM

## 2022-04-26 PROCEDURE — 99213 OFFICE O/P EST LOW 20 MIN: CPT | Performed by: NURSE PRACTITIONER

## 2022-04-26 RX ORDER — ALPRAZOLAM 0.25 MG/1
0.25 TABLET ORAL
Qty: 30 TABLET | Refills: 2 | Status: SHIPPED | OUTPATIENT
Start: 2022-04-26 | End: 2022-08-19 | Stop reason: SDUPTHER

## 2022-04-26 NOTE — PROGRESS NOTES
Chief Complaint   Patient presents with    Anxiety    Medication Evaluation     Patient in office today for med check:  Xanax is doing well for anxiety    Have no concerns. 1. Have you been to the ER, urgent care clinic since your last visit? Hospitalized since your last visit? No    2. Have you seen or consulted any other health care providers outside of the [address] [place] [address] since your last visit? Include any pap smears or colon screening.  No

## 2022-04-26 NOTE — PROGRESS NOTES
Monae Faust is a 52 y.o. female who was seen by synchronous (real-time) audio-video technology on 4/26/2022 for Anxiety and Medication Evaluation        Assessment & Plan:   Diagnoses and all orders for this visit:    1. Anxiety  -     ALPRAZolam (XANAX) 0.25 mg tablet; Take 1 Tablet by mouth two (2) times daily as needed for Anxiety. Max Daily Amount: 0.5 mg. TAKE 1 TABLET BY MOUTH TWICE DAILY AS NEEDED FOR ANXIETY. MAX DAILY AMOUNT: 0.5 MG-  Refilled rx. Reinforced SEs/ADRs of medication and how to take responsibly. Enc pt to consider alt medication options like increasing lexapro or changing to an alt daily med for KARLY. Requested she follow up with [facility] for future refills. I spent at least 15 minutes on this visit with this established patient. 712  Subjective:     Chief Complaint   Patient presents with    Anxiety    Medication Evaluation     Patient in office today for med check. Pt normally sees [facility]. She is prescribed xanax for anxiety disorder. Xanax is doing well for anxiety. Last VV with [facility] was in on 2/2. Pt was prescribed 30 tabs with 2 refills with the instructions to take 1 tab BID PRN. Pt has been on this medication for a few years now. Had an uptick in anxiety last year that has required some additional anxiety. Usually more of a situational anxiety, helps \"take the edge off. \"   Also taking lexpro 10 mg once daily. Thinks she has tried a higher dose of the lexapro but didn't tolerate it. Denies any other concerns at this time. Prior to Admission medications    Medication Sig Start Date End Date Taking? Authorizing Provider   gabapentin (NEURONTIN) 400 mg capsule TAKE 1 CAPSULE BY MOUTH FOUR TIMES DAILY.  MAX DAILY AMOUNT: 1600 MG 4/22/22  Yes Morales Chung NP   methocarbamoL (ROBAXIN) 750 mg tablet Take 1 Tablet by mouth three (3) times daily as needed for Muscle Spasm(s). 3/22/22  Yes Morales Chung NP   ketorolac (TORADOL) 10 mg tablet Take 1 Tablet by mouth every six (6) hours as needed for Pain. 3/20/22  Yes Lexus Lundberg, NP   ALPRAZolam [place]) 0.25 mg tablet Take 1 Tablet by mouth two (2) times daily as needed for Anxiety. Max Daily Amount: 0.5 mg. TAKE 1 TABLET BY MOUTH TWICE DAILY AS NEEDED FOR ANXIETY. MAX DAILY AMOUNT: 0.5 MG- 2/2/22  Yes Lexus Lundberg NP   celecoxib (CELEBREX) 200 mg capsule TAKE 1 CAPSULE BY MOUTH TWICE DAILY 1/16/22  Yes Lexus Lundberg, MARKIE   escitalopram oxalate (LEXAPRO) 10 mg tablet Take 1.5 Tablets by mouth daily. 9/7/21  Yes Lexus Lundberg NP   estradioL (CLIMARA) 0.05 mg/24 hr 1 Patch by TransDERmal route Every Saturday. Patient not taking: Reported on 6/2/2021 1/30/21 4/26/22  Kate Tillman MD   melatonin 3 mg tablet Take  by mouth daily. 4/26/22  Provider, Historical     Patient Active Problem List    Diagnosis Date Noted    Migraine 03/15/2016    ADHD (attention deficit hyperactivity disorder)     Ankylosing spondylitis (HCC)     Endometriosis     Fibroids     Fibromyalgia      Current Outpatient Medications   Medication Sig Dispense Refill    ALPRAZolam (XANAX) 0.25 mg tablet Take 1 Tablet by mouth two (2) times daily as needed for Anxiety. Max Daily Amount: 0.5 mg. TAKE 1 TABLET BY MOUTH TWICE DAILY AS NEEDED FOR ANXIETY. MAX DAILY AMOUNT: 0.5 MG- 30 Tablet 2    gabapentin (NEURONTIN) 400 mg capsule TAKE 1 CAPSULE BY MOUTH FOUR TIMES DAILY. MAX DAILY AMOUNT: 1600  Capsule 1    methocarbamoL (ROBAXIN) 750 mg tablet Take 1 Tablet by mouth three (3) times daily as needed for Muscle Spasm(s). 90 Tablet 1    ketorolac (TORADOL) 10 mg tablet Take 1 Tablet by mouth every six (6) hours as needed for Pain. 10 Tablet 0    celecoxib (CELEBREX) 200 mg capsule TAKE 1 CAPSULE BY MOUTH TWICE DAILY 60 Capsule 5    escitalopram oxalate (LEXAPRO) 10 mg tablet Take 1.5 Tablets by mouth daily.  135 Tablet 3     Allergies   Allergen Reactions    Phenergan [Promethazine] Unknown (comments)    Qsymia [Phentermine-Topiramate] Other (comments)     Vision loss        ROS    Objective:     Patient-Reported Vitals 2/1/2022   Patient-Reported Weight 210   Patient-Reported Height 58   Patient-Reported Temperature -   Patient-Reported SpO2 -   Patient-Reported Systolic  212   Patient-Reported Diastolic 80      General: alert, cooperative, no distress   Mental  status: normal mood, behavior   HENT: NCAT   Neck: no visualized mass   Resp: no respiratory distress                 Additional exam findings: We discussed the expected course, resolution and complications of the diagnosis(es) in detail. Medication risks, benefits, costs, interactions, and alternatives were discussed as indicated. I advised her to contact the office if her condition worsens, changes or fails to improve as anticipated. She expressed understanding with the diagnosis(es) and plan. Ceci Bell, was evaluated through a synchronous (real-time) audio-video encounter. The patient (or guardian if applicable) is aware that this is a billable service, which includes applicable co-pays. Verbal consent to proceed has been obtained. The visit was conducted pursuant to the emergency declaration under the [address] [place] [address], [address] [place] [address] authority and the [employer] General Act. Patient identification was verified, and a caregiver was present when appropriate. The patient was located at home in a state where the provider was licensed to provide care.     Hugh Lundy NP

## 2022-04-27 NOTE — PROGRESS NOTES
Annual exam ages 40-58    Jose Miguel Lehman is a ,  52 y.o. female WHITE/NON- No LMP recorded. Patient has had a hysterectomy. , who presents for her annual checkup. PARADISE/BSO for endometriosis  AE=21    Since her last annual GYN exam about one year ago,  she has the following changes in her health history:   - foot surgery in January. Has limited activity level. Gradually increasing steps/d. Has been on and off ERT including off-label testosterone tx in past.  Was given eRX for Climara patch at last AE. Used patch for 6-8wks, stopped b/c she did not see improvement in libido. Has long h/o decreased libido. Had been on TT in past, worked initially, then eventually did not. Not interested in RX. ADDITIONAL CONCERNS:  She is having urinary incontinence. UMESH, feels that bladder isn't emptying completely. No urge sx. With regard to the Gardasil vaccine, she is older than the age for which it is FDA approved. Menstrual status:      She denies dysmenorrhea. She denies premenstrual symptoms. Contraception:    The current method of family planning is status post hysterectomy. Sexual history:     reports being sexually active and has had partner(s) who are male. She reports using the following method of birth control/protection: Surgical.        Pap and Mammogram History:    Her most recent Pap smear was normal, HPV was neg, obtained 2019. She does not have a history of abnormal pap smears. The patient had her mammogram today in our office. Past Medical History:   Diagnosis Date    ADHD (attention deficit hyperactivity disorder)     Endometriosis     Fibroids     Fibromyalgia     Hx of mammogram 2021    Negative. No mammographic evidence of malignancy.     Infertility, female    [facility] Screening for malignant neoplasm of the cervix 2019    Negative ; HPV Negative     Past Surgical History:   Procedure Laterality Date    HX APPENDECTOMY      HX COLONOSCOPY      HX DILATION AND CURETTAGE      HX HYSTEROSCOPY WITH ENDOMETRIAL ABLATION      HX ORTHOPAEDIC  2013    left foot surgery cyst removed    HX PARADISE AND BSO  07    due to endometriosis    HX TOTAL COLECTOMY       OB History    Para Term  AB Living   1 1 1 0 0 1   SAB IAB Ectopic Molar Multiple Live Births   0 0 0   0 1      # Outcome Date GA Lbr Sohail/2nd Weight Sex Delivery Anes PTL Lv   1 Term 05 38w0d 10:00 7 lb 12 oz (3.515 kg) M VAGINAL DELI None  DORINDA       Current Outpatient Medications   Medication Sig Dispense Refill    ALPRAZolam (XANAX) 0.25 mg tablet Take 1 Tablet by mouth two (2) times daily as needed for Anxiety. Max Daily Amount: 0.5 mg. TAKE 1 TABLET BY MOUTH TWICE DAILY AS NEEDED FOR ANXIETY. MAX DAILY AMOUNT: 0.5 MG- 30 Tablet 2    gabapentin (NEURONTIN) 400 mg capsule TAKE 1 CAPSULE BY MOUTH FOUR TIMES DAILY. MAX DAILY AMOUNT: 1600  Capsule 1    methocarbamoL (ROBAXIN) 750 mg tablet Take 1 Tablet by mouth three (3) times daily as needed for Muscle Spasm(s). 90 Tablet 1    ketorolac (TORADOL) 10 mg tablet Take 1 Tablet by mouth every six (6) hours as needed for Pain. 10 Tablet 0    celecoxib (CELEBREX) 200 mg capsule TAKE 1 CAPSULE BY MOUTH TWICE DAILY 60 Capsule 5    escitalopram oxalate (LEXAPRO) 10 mg tablet Take 1.5 Tablets by mouth daily.  135 Tablet 3     Allergies: Phenergan [promethazine] and Qsymia [phentermine-topiramate]   Social History     Socioeconomic History    Marital status:      Spouse name: Not on file    Number of children: Not on file    Years of education: Not on file    Highest education level: Not on file   Occupational History    Not on file   Tobacco Use    Smoking status: Former Smoker     Quit date: 2017     Years since quittin.7    Smokeless tobacco: Never Used    Tobacco comment: Vapes currently   Substance and Sexual Activity    Alcohol use: No     Alcohol/week: 0.0 standard drinks    Drug use: No    Sexual activity: Yes     Partners: Male     Birth control/protection: Surgical   Other Topics Concern    Not on file   Social History Narrative    Not on file     Social Determinants of Health     Financial Resource Strain:     Difficulty of Paying Living Expenses: Not on file   Food Insecurity:     Worried About Running Out of Food in the Last Year: Not on file    Jacob of Food in the Last Year: Not on file   Transportation Needs:     Lack of Transportation (Medical): Not on file    Lack of Transportation (Non-Medical): Not on file   Physical Activity:     Days of Exercise per Week: Not on file    Minutes of Exercise per Session: Not on file   Stress:     Feeling of Stress : Not on file   Social Connections:     Frequency of Communication with Friends and Family: Not on file    Frequency of Social Gatherings with Friends and Family: Not on file    Attends Sikhism Services: Not on file    Active Member of [address] [place] [address] or Organizations: Not on file    Attends Club or Organization Meetings: Not on file    Marital Status: Not on file   Intimate Partner Violence:     Fear of Current or Ex-Partner: Not on file    Emotionally Abused: Not on file    Physically Abused: Not on file    Sexually Abused: Not on file   Housing Stability:     Unable to Pay for Housing in the Last Year: Not on file    Number of [place] in the Last Year: Not on file    Unstable Housing in the Last Year: Not on file     Tobacco History:  reports that she quit smoking about 4 years ago. She has never used smokeless tobacco.  Alcohol Abuse:  reports no history of alcohol use. Drug Abuse:  reports no history of drug use.     Patient Active Problem List   Diagnosis Code    ADHD (attention deficit hyperactivity disorder) F90.9    Ankylosing spondylitis ([facility] [address] M45.9    Endometriosis N80.9    Fibroids D21.9    Fibromyalgia M79.7    Migraine G43.909     Family History   Problem Relation Age of Onset    Alcohol abuse Other     Arthritis-rheumatoid Other     Stroke Other     Cancer Other         bladder and tongue    Stroke Maternal Grandmother        Review of Systems - History obtained from the patient  Constitutional: negative for weight loss, fever, night sweats  HEENT: negative for hearing loss, earache, congestion, snoring, sorethroat  CV: negative for chest pain, palpitations, edema  Resp: negative for cough, shortness of breath, wheezing  GI: negative for change in bowel habits, abdominal pain, black or bloody stools  : negative for frequency, dysuria, hematuria, vaginal discharge  MSK: foot surgery in January  Breast: negative for breast lumps, nipple discharge, galactorrhea  Skin :negative for itching, rash, hives  Neuro: negative for dizziness, confusion, weakness  Psych: negative for anxiety, depression, change in mood  Heme/lymph: negative for bleeding, bruising, pallor    Physical Exam    Visit Vitals  /76   Ht 5' 9\" (1.753 m)   Wt 212 lb 3.2 oz (96.3 kg)   BMI 31.34 kg/m²       Constitutional  · Appearance: well-nourished, well developed, alert, in no acute distress    HENT  · Head and Face: appears normal    Neck  · Inspection/Palpation: normal appearance, no masses or tenderness  · Lymph Nodes: no lymphadenopathy present  · Thyroid: gland size normal, nontender, no nodules or masses present on palpation    Chest  · Respiratory Effort: breathing unlabored  · Auscultation: normal breath sounds    Cardiovascular  · Heart:  · Auscultation: regular rate and rhythm without murmur    Breasts  · Inspection of Breasts: breasts symmetrical, no skin changes, no discharge present, nipple appearance normal, no skin retraction present  · Palpation of Breasts and Axillae: no masses present on palpation, no breast tenderness  · Axillary Lymph Nodes: no lymphadenopathy present    Gastrointestinal  · Abdominal Examination: abdomen non-tender to palpation, normal bowel sounds, no masses present  · Liver and spleen: no hepatomegaly present, spleen not palpable  · Hernias: no hernias identified    Genitourinary  · External Genitalia: normal appearance for age, no discharge present, no tenderness present, no inflammatory lesions present, no masses present, moderate atrophy present  · Vagina: normal vaginal vault without central or paravaginal defects, no discharge present, no inflammatory lesions present, no masses present  · Bladder: non-tender to palpation  · Urethra: appears normal  · Cervix: absent   · Uterus: absent  · Adnexa: no adnexal tenderness present, no adnexal masses present  · Perineum: perineum within normal limits, no evidence of trauma, no rashes or skin lesions present  · Anus: anus within normal limits, no hemorrhoids present  · Inguinal Lymph Nodes: no lymphadenopathy present    Skin  · General Inspection: no rash, no lesions identified    Neurologic/Psychiatric  · Mental Status:  · Orientation: grossly oriented to person, place and time  · Mood and Affect: mood normal, affect appropriate    Results for orders placed or performed during the hospital encounter of 04/28/22   NIR 3D LEXIS W MAMMO BI SCREENING INCL CAD    Narrative    STUDY: Bilateral digital screening mammogram with 3-D tomosynthesis    INDICATION:  Screening. COMPARISON: 2021, [id number]    BREAST COMPOSITION: The breasts are almost entirely fatty. FINDINGS: Bilateral digital screening mammography was performed and is  interpreted in conjunction with a computer assisted detection (CAD) system. Additionally, tomosynthesis of both breasts in the CC and MLO projections was  performed. No suspicious masses or calcifications are identified. There has been  no significant change. Impression    BI-RADS 1: Negative. No mammographic evidence of malignancy. RECOMMENDATIONS:  Next screening mammogram is recommended in one year. The patient will be notified of these results.          Assessment & Plan:  · Routine gynecologic examination. Pap/HPV neg 11/26/19  · Her current medical status is satisfactory with no evidence of significant gynecologic issues. · Some UMESH, feeling of incomplete emptying. Discussed PT, Anh Thompson, urology eval.  · Counseled re: diet, exercise, healthy lifestyle  · Return for yearly wellness visits  · Rec annual mammogram.  Done today in our office.   · Patient verbalized understanding

## 2022-04-28 ENCOUNTER — OFFICE VISIT (OUTPATIENT)
Dept: OBGYN CLINIC | Age: 47
End: 2022-04-28

## 2022-04-28 VITALS
SYSTOLIC BLOOD PRESSURE: 115 MMHG | BODY MASS INDEX: 31.43 KG/M2 | WEIGHT: 212.2 LBS | HEIGHT: 69 IN | DIASTOLIC BLOOD PRESSURE: 76 MMHG

## 2022-04-28 DIAGNOSIS — Z01.419 ENCOUNTER FOR WELL WOMAN EXAM WITH ROUTINE GYNECOLOGICAL EXAM: Primary | ICD-10-CM

## 2022-04-28 PROCEDURE — 99396 PREV VISIT EST AGE 40-64: CPT | Performed by: OBSTETRICS & GYNECOLOGY

## 2022-05-03 DIAGNOSIS — G43.909 MIGRAINE WITHOUT STATUS MIGRAINOSUS, NOT INTRACTABLE, UNSPECIFIED MIGRAINE TYPE: ICD-10-CM

## 2022-05-03 RX ORDER — KETOROLAC TROMETHAMINE 10 MG/1
TABLET, FILM COATED ORAL
Qty: 10 TABLET | Refills: 0 | Status: SHIPPED | OUTPATIENT
Start: 2022-05-03 | End: 2022-06-13

## 2022-05-16 RX ORDER — METHOCARBAMOL 750 MG/1
TABLET, FILM COATED ORAL
Qty: 90 TABLET | Refills: 1 | Status: SHIPPED | OUTPATIENT
Start: 2022-05-16 | End: 2022-08-19 | Stop reason: SDUPTHER

## 2022-06-07 ENCOUNTER — OFFICE VISIT (OUTPATIENT)
Dept: FAMILY MEDICINE CLINIC | Age: 47
End: 2022-06-07
Payer: COMMERCIAL

## 2022-06-07 VITALS
RESPIRATION RATE: 20 BRPM | HEART RATE: 76 BPM | DIASTOLIC BLOOD PRESSURE: 70 MMHG | BODY MASS INDEX: 31.99 KG/M2 | WEIGHT: 216 LBS | SYSTOLIC BLOOD PRESSURE: 101 MMHG | OXYGEN SATURATION: 99 % | TEMPERATURE: 98.4 F | HEIGHT: 69 IN

## 2022-06-07 DIAGNOSIS — Z00.00 WELL ADULT EXAM: Primary | ICD-10-CM

## 2022-06-07 DIAGNOSIS — Z11.59 ENCOUNTER FOR HEPATITIS C SCREENING TEST FOR LOW RISK PATIENT: ICD-10-CM

## 2022-06-07 DIAGNOSIS — M25.50 MULTIPLE JOINT PAIN: ICD-10-CM

## 2022-06-07 DIAGNOSIS — M45.9 ANKYLOSING SPONDYLITIS, UNSPECIFIED SITE OF SPINE (HCC): ICD-10-CM

## 2022-06-07 PROCEDURE — 99396 PREV VISIT EST AGE 40-64: CPT | Performed by: NURSE PRACTITIONER

## 2022-06-07 NOTE — PROGRESS NOTES
Chief Complaint   Patient presents with    Labs     to check for diabetes     Weight Management     Pt being seen for labs  -pt has concerns with possibly being pre-diabetic  Pt wants to discuss weight    1. Have you been to the ER, urgent care clinic since your last visit? Hospitalized since your last visit? No    2. Have you seen or consulted any other health care providers outside of the [address] [place] [address] since your last visit? Include any pap smears or colon screening.  No     Pt has no other concerns

## 2022-06-07 NOTE — PROGRESS NOTES
Subjective:   52 y.o. female for Well Woman Check. Her gyne and breast care is done elsewhere by her Ob-Gyne physician. Patient Active Problem List    Diagnosis Date Noted    Migraine 03/15/2016    ADHD (attention deficit hyperactivity disorder)     Ankylosing spondylitis (HCC)     Endometriosis     Fibroids     Fibromyalgia      Current Outpatient Medications   Medication Sig Dispense Refill    methocarbamoL (ROBAXIN) 750 mg tablet TAKE 1 TABLET BY MOUTH THREE TIMES DAILY AS NEEDED FOR MUSCLE SPASMS 90 Tablet 1    ketorolac (TORADOL) 10 mg tablet TAKE 1 TABLET BY MOUTH EVERY 6 HOURS AS NEEDED FOR PAIN 10 Tablet 0    ALPRAZolam (XANAX) 0.25 mg tablet Take 1 Tablet by mouth two (2) times daily as needed for Anxiety. Max Daily Amount: 0.5 mg. TAKE 1 TABLET BY MOUTH TWICE DAILY AS NEEDED FOR ANXIETY. MAX DAILY AMOUNT: 0.5 MG- 30 Tablet 2    gabapentin (NEURONTIN) 400 mg capsule TAKE 1 CAPSULE BY MOUTH FOUR TIMES DAILY. MAX DAILY AMOUNT: 1600  Capsule 1    celecoxib (CELEBREX) 200 mg capsule TAKE 1 CAPSULE BY MOUTH TWICE DAILY 60 Capsule 5    escitalopram oxalate (LEXAPRO) 10 mg tablet Take 1.5 Tablets by mouth daily. 135 Tablet 3     Family History   Problem Relation Age of Onset    Alcohol abuse Other     Arthritis-rheumatoid Other     Stroke Other     Cancer Other         bladder and tongue    Stroke Maternal Grandmother      Social History     Tobacco Use    Smoking status: Former Smoker     Quit date: 2017     Years since quittin.9    Smokeless tobacco: Never Used    Tobacco comment: Vapes currently   Substance Use Topics    Alcohol use: No     Alcohol/week: 0.0 standard drinks             ROS: Feeling generally well. No TIA's or unusual headaches, no dysphagia. No prolonged cough. No dyspnea or chest pain on exertion. No abdominal pain, change in bowel habits, black or bloody stools. No urinary tract symptoms. No new or unusual musculoskeletal symptoms.     Specific concerns today: having weight gain and severe joint pain, wants second opinion for this. Objective: The patient appears well, alert, oriented x 3, in no distress. Visit Vitals  /70 (BP 1 Location: Left upper arm, BP Patient Position: Sitting)   Pulse 76   Temp 98.4 °F (36.9 °C) (Oral)   Resp 20   Ht 5' 9\" (1.753 m)   Wt 216 lb (98 kg)   SpO2 99%   BMI 31.90 kg/m²     ENT normal.  Neck supple. No adenopathy or thyromegaly. NAN. Lungs are clear, good air entry, no wheezes, rhonchi or rales. S1 and S2 normal, no murmurs, regular rate and rhythm. Abdomen soft without tenderness, guarding, mass or organomegaly. Extremities show no edema, normal peripheral pulses. Neurological is normal, no focal findings. Breast and Pelvic exams are deferred. Assessment/Plan:   Well Woman  lose weight, increase physical activity, follow low fat diet, follow low salt diet, routine labs ordered  Encounter Diagnoses   Name Primary?  Well adult exam Yes    Ankylosing spondylitis, unspecified site of spine ([facility] [address]     Multiple joint pain     Encounter for hepatitis C screening test for low risk patient      Orders Placed This Encounter    CBC WITH AUTOMATED DIFF    METABOLIC PANEL, COMPREHENSIVE    TSH 3RD GENERATION    LIPID PANEL    HEPATITIS C AB    REFERRAL TO RHEUMATOLOGY     I have discussed the diagnosis with the patient and the intended plan as seen in the above orders. The patient has received an after-visit summary and questions were answered concerning future plans. Patient conveyed understanding of the plan at the time of the visit.     Leslee Santana, MSN, ANP  6/7/2022

## 2022-06-08 LAB
ALBUMIN SERPL-MCNC: 4 G/DL (ref 3.5–5)
ALBUMIN/GLOB SERPL: 1.5 {RATIO} (ref 1.1–2.2)
ALP SERPL-CCNC: 91 U/L (ref 45–117)
ALT SERPL-CCNC: 46 U/L (ref 12–78)
ANION GAP SERPL CALC-SCNC: 5 MMOL/L (ref 5–15)
AST SERPL-CCNC: 23 U/L (ref 15–37)
BASOPHILS # BLD: 0 K/UL (ref 0–0.1)
BASOPHILS NFR BLD: 1 % (ref 0–1)
BILIRUB SERPL-MCNC: 0.3 MG/DL (ref 0.2–1)
BUN SERPL-MCNC: 16 MG/DL (ref 6–20)
BUN/CREAT SERPL: 20 (ref 12–20)
CALCIUM SERPL-MCNC: 9.2 MG/DL (ref 8.5–10.1)
CHLORIDE SERPL-SCNC: 109 MMOL/L (ref 97–108)
CHOLEST SERPL-MCNC: 262 MG/DL
CO2 SERPL-SCNC: 27 MMOL/L (ref 21–32)
CREAT SERPL-MCNC: 0.81 MG/DL (ref 0.55–1.02)
DIFFERENTIAL METHOD BLD: ABNORMAL
EOSINOPHIL # BLD: 0.1 K/UL (ref 0–0.4)
EOSINOPHIL NFR BLD: 2 % (ref 0–7)
ERYTHROCYTE [DISTWIDTH] IN BLOOD BY AUTOMATED COUNT: 12.6 % (ref 11.5–14.5)
GLOBULIN SER CALC-MCNC: 2.6 G/DL (ref 2–4)
GLUCOSE SERPL-MCNC: 98 MG/DL (ref 65–100)
HCT VFR BLD AUTO: 40.7 % (ref 35–47)
HCV AB SERPL QL IA: NONREACTIVE
HDLC SERPL-MCNC: 48 MG/DL
HDLC SERPL: 5.5 {RATIO} (ref 0–5)
HGB BLD-MCNC: 12.7 G/DL (ref 11.5–16)
IMM GRANULOCYTES # BLD AUTO: 0 K/UL (ref 0–0.04)
IMM GRANULOCYTES NFR BLD AUTO: 0 % (ref 0–0.5)
LDLC SERPL CALC-MCNC: 176 MG/DL (ref 0–100)
LYMPHOCYTES # BLD: 1.9 K/UL (ref 0.8–3.5)
LYMPHOCYTES NFR BLD: 41 % (ref 12–49)
MCH RBC QN AUTO: 31.6 PG (ref 26–34)
MCHC RBC AUTO-ENTMCNC: 31.2 G/DL (ref 30–36.5)
MCV RBC AUTO: 101.2 FL (ref 80–99)
MONOCYTES # BLD: 0.4 K/UL (ref 0–1)
MONOCYTES NFR BLD: 9 % (ref 5–13)
NEUTS SEG # BLD: 2.2 K/UL (ref 1.8–8)
NEUTS SEG NFR BLD: 47 % (ref 32–75)
NRBC # BLD: 0 K/UL (ref 0–0.01)
NRBC BLD-RTO: 0 PER 100 WBC
PLATELET # BLD AUTO: 249 K/UL (ref 150–400)
PMV BLD AUTO: 9.9 FL (ref 8.9–12.9)
POTASSIUM SERPL-SCNC: 4.4 MMOL/L (ref 3.5–5.1)
PROT SERPL-MCNC: 6.6 G/DL (ref 6.4–8.2)
RBC # BLD AUTO: 4.02 M/UL (ref 3.8–5.2)
SODIUM SERPL-SCNC: 141 MMOL/L (ref 136–145)
TRIGL SERPL-MCNC: 190 MG/DL (ref ?–150)
TSH SERPL DL<=0.05 MIU/L-ACNC: 0.69 UIU/ML (ref 0.36–3.74)
VLDLC SERPL CALC-MCNC: 38 MG/DL
WBC # BLD AUTO: 4.6 K/UL (ref 3.6–11)

## 2022-06-10 NOTE — PROGRESS NOTES
Hey there, your cholesterol is impressively high. This is called familial hypercholesterolemia. It is genetic. This is not really diet related unfortunately and we really need to consider starting a statin med to get this down. This good new is that even a very low dose of Crestor can make a huge difference. Let me know your thoughts.  Suze Juárez

## 2022-06-11 DIAGNOSIS — M45.9 ANKYLOSING SPONDYLITIS, UNSPECIFIED SITE OF SPINE (HCC): ICD-10-CM

## 2022-06-11 DIAGNOSIS — M79.7 FIBROMYALGIA: ICD-10-CM

## 2022-06-11 DIAGNOSIS — G43.909 MIGRAINE WITHOUT STATUS MIGRAINOSUS, NOT INTRACTABLE, UNSPECIFIED MIGRAINE TYPE: ICD-10-CM

## 2022-06-13 RX ORDER — KETOROLAC TROMETHAMINE 10 MG/1
TABLET, FILM COATED ORAL
Qty: 10 TABLET | Refills: 0 | Status: SHIPPED | OUTPATIENT
Start: 2022-06-13 | End: 2022-07-09

## 2022-06-13 RX ORDER — GABAPENTIN 400 MG/1
CAPSULE ORAL
Qty: 120 CAPSULE | Refills: 3 | Status: SHIPPED | OUTPATIENT
Start: 2022-06-13 | End: 2022-10-30 | Stop reason: SDUPTHER

## 2022-06-13 RX ORDER — ROSUVASTATIN CALCIUM 5 MG/1
5 TABLET, COATED ORAL
Qty: 90 TABLET | Refills: 3 | Status: SHIPPED | OUTPATIENT
Start: 2022-06-13

## 2022-07-09 DIAGNOSIS — G43.909 MIGRAINE WITHOUT STATUS MIGRAINOSUS, NOT INTRACTABLE, UNSPECIFIED MIGRAINE TYPE: ICD-10-CM

## 2022-07-09 RX ORDER — KETOROLAC TROMETHAMINE 10 MG/1
TABLET, FILM COATED ORAL
Qty: 10 TABLET | Refills: 0 | Status: SHIPPED | OUTPATIENT
Start: 2022-07-09 | End: 2022-08-07

## 2022-08-01 RX ORDER — CELECOXIB 200 MG/1
CAPSULE ORAL
Qty: 60 CAPSULE | Refills: 5 | Status: SHIPPED | OUTPATIENT
Start: 2022-08-01

## 2022-08-06 DIAGNOSIS — G43.909 MIGRAINE WITHOUT STATUS MIGRAINOSUS, NOT INTRACTABLE, UNSPECIFIED MIGRAINE TYPE: ICD-10-CM

## 2022-08-07 RX ORDER — KETOROLAC TROMETHAMINE 10 MG/1
TABLET, FILM COATED ORAL
Qty: 10 TABLET | Refills: 0 | Status: SHIPPED | OUTPATIENT
Start: 2022-08-07 | End: 2022-09-29 | Stop reason: SDUPTHER

## 2022-08-19 DIAGNOSIS — F41.9 ANXIETY: ICD-10-CM

## 2022-08-21 RX ORDER — ALPRAZOLAM 0.25 MG/1
0.25 TABLET ORAL
Qty: 30 TABLET | Refills: 2 | Status: SHIPPED | OUTPATIENT
Start: 2022-08-21

## 2022-08-22 RX ORDER — METHOCARBAMOL 750 MG/1
750 TABLET, FILM COATED ORAL 3 TIMES DAILY
Qty: 90 TABLET | Refills: 1 | Status: SHIPPED | OUTPATIENT
Start: 2022-08-22 | End: 2022-10-30 | Stop reason: SDUPTHER

## 2022-08-22 RX ORDER — ESCITALOPRAM OXALATE 10 MG/1
15 TABLET ORAL DAILY
Qty: 135 TABLET | Refills: 3 | Status: SHIPPED | OUTPATIENT
Start: 2022-08-22

## 2022-08-31 ENCOUNTER — APPOINTMENT (OUTPATIENT)
Dept: CT IMAGING | Age: 47
End: 2022-08-31
Attending: EMERGENCY MEDICINE
Payer: COMMERCIAL

## 2022-08-31 ENCOUNTER — HOSPITAL ENCOUNTER (EMERGENCY)
Age: 47
Discharge: HOME | End: 2022-08-31
Attending: EMERGENCY MEDICINE
Payer: COMMERCIAL

## 2022-08-31 VITALS
HEART RATE: 74 BPM | WEIGHT: 206 LBS | BODY MASS INDEX: 31.22 KG/M2 | SYSTOLIC BLOOD PRESSURE: 103 MMHG | DIASTOLIC BLOOD PRESSURE: 73 MMHG | RESPIRATION RATE: 18 BRPM | OXYGEN SATURATION: 99 % | TEMPERATURE: 98.3 F | HEIGHT: 68 IN

## 2022-08-31 DIAGNOSIS — R10.811 RIGHT UPPER QUADRANT ABDOMINAL TENDERNESS WITHOUT REBOUND TENDERNESS: Primary | ICD-10-CM

## 2022-08-31 LAB
ALBUMIN SERPL-MCNC: 5 G/DL (ref 3.5–5.2)
ALBUMIN/GLOB SERPL: 2.1 {RATIO} (ref 1.1–2.2)
ALP SERPL-CCNC: 128 U/L (ref 35–104)
ALT SERPL-CCNC: 26 U/L (ref 10–35)
ANION GAP SERPL CALC-SCNC: 9 MMOL/L (ref 5–15)
APPEARANCE UR: CLEAR
AST SERPL-CCNC: 17 U/L (ref 10–35)
BACTERIA URNS QL MICRO: NEGATIVE /HPF
BASOPHILS # BLD: 0 K/UL (ref 0–0.1)
BASOPHILS NFR BLD: 1 % (ref 0–1)
BILIRUB SERPL-MCNC: 0.2 MG/DL (ref 0.2–1)
BILIRUB UR QL: NEGATIVE
BUN SERPL-MCNC: 16 MG/DL (ref 6–20)
BUN/CREAT SERPL: 19 (ref 12–20)
CALCIUM SERPL-MCNC: 10.1 MG/DL (ref 8.6–10)
CHLORIDE SERPL-SCNC: 103 MMOL/L (ref 98–107)
CO2 SERPL-SCNC: 27 MMOL/L (ref 22–29)
COLOR UR: NORMAL
CREAT SERPL-MCNC: 0.83 MG/DL (ref 0.5–0.9)
DIFFERENTIAL METHOD BLD: NORMAL
EOSINOPHIL # BLD: 0.1 K/UL (ref 0–0.4)
EOSINOPHIL NFR BLD: 1 % (ref 0–7)
EPITH CASTS URNS QL MICRO: NORMAL /LPF
ERYTHROCYTE [DISTWIDTH] IN BLOOD BY AUTOMATED COUNT: 12.5 % (ref 11.5–14.5)
GLOBULIN SER CALC-MCNC: 2.4 G/DL (ref 2–4)
GLUCOSE SERPL-MCNC: 107 MG/DL (ref 65–100)
GLUCOSE UR STRIP.AUTO-MCNC: NEGATIVE MG/DL
HCG UR QL: NEGATIVE
HCT VFR BLD AUTO: 42.6 % (ref 35–47)
HGB BLD-MCNC: 14.2 G/DL (ref 11.5–16)
HGB UR QL STRIP: NEGATIVE
IMM GRANULOCYTES # BLD AUTO: 0 K/UL (ref 0–0.04)
IMM GRANULOCYTES NFR BLD AUTO: 0 % (ref 0–0.5)
KETONES UR QL STRIP.AUTO: NEGATIVE MG/DL
LEUKOCYTE ESTERASE UR QL STRIP.AUTO: NEGATIVE
LIPASE SERPL-CCNC: 29 U/L (ref 13–60)
LYMPHOCYTES # BLD: 2.6 K/UL (ref 0.8–3.5)
LYMPHOCYTES NFR BLD: 39 % (ref 12–49)
MCH RBC QN AUTO: 31.1 PG (ref 26–34)
MCHC RBC AUTO-ENTMCNC: 33.3 G/DL (ref 30–36.5)
MCV RBC AUTO: 93.2 FL (ref 80–99)
MONOCYTES # BLD: 0.6 K/UL (ref 0–1)
MONOCYTES NFR BLD: 10 % (ref 5–13)
NEUTS SEG # BLD: 3.3 K/UL (ref 1.8–8)
NEUTS SEG NFR BLD: 50 % (ref 32–75)
NITRITE UR QL STRIP.AUTO: NEGATIVE
NRBC # BLD: 0 K/UL (ref 0–0.01)
NRBC BLD-RTO: 0 PER 100 WBC
PH UR STRIP: 5.5 [PH] (ref 5–8)
PLATELET # BLD AUTO: 273 K/UL (ref 150–400)
PMV BLD AUTO: 9.9 FL (ref 8.9–12.9)
POTASSIUM SERPL-SCNC: 4.9 MMOL/L (ref 3.5–5.1)
PROT SERPL-MCNC: 7.4 G/DL (ref 6.4–8.3)
PROT UR STRIP-MCNC: NEGATIVE MG/DL
RBC # BLD AUTO: 4.57 M/UL (ref 3.8–5.2)
RBC #/AREA URNS HPF: NORMAL /HPF
SODIUM SERPL-SCNC: 139 MMOL/L (ref 136–145)
SP GR UR REFRACTOMETRY: 1.01 (ref 1–1.03)
UR CULT HOLD, URHOLD: NORMAL
UROBILINOGEN UR QL STRIP.AUTO: 0.2 EU/DL (ref 0.2–1)
WBC # BLD AUTO: 6.7 K/UL (ref 3.6–11)
WBC URNS QL MICRO: NORMAL /HPF (ref 0–4)

## 2022-08-31 PROCEDURE — 96372 THER/PROPH/DIAG INJ SC/IM: CPT

## 2022-08-31 PROCEDURE — 74011000250 HC RX REV CODE- 250: Performed by: EMERGENCY MEDICINE

## 2022-08-31 PROCEDURE — 74011000636 HC RX REV CODE- 636: Performed by: EMERGENCY MEDICINE

## 2022-08-31 PROCEDURE — 85025 COMPLETE CBC W/AUTO DIFF WBC: CPT

## 2022-08-31 PROCEDURE — 81001 URINALYSIS AUTO W/SCOPE: CPT

## 2022-08-31 PROCEDURE — 80053 COMPREHEN METABOLIC PANEL: CPT

## 2022-08-31 PROCEDURE — 74011250636 HC RX REV CODE- 250/636: Performed by: EMERGENCY MEDICINE

## 2022-08-31 PROCEDURE — 74011250637 HC RX REV CODE- 250/637: Performed by: EMERGENCY MEDICINE

## 2022-08-31 PROCEDURE — 99285 EMERGENCY DEPT VISIT HI MDM: CPT

## 2022-08-31 PROCEDURE — 36415 COLL VENOUS BLD VENIPUNCTURE: CPT

## 2022-08-31 PROCEDURE — 96374 THER/PROPH/DIAG INJ IV PUSH: CPT

## 2022-08-31 PROCEDURE — 81025 URINE PREGNANCY TEST: CPT

## 2022-08-31 PROCEDURE — 83690 ASSAY OF LIPASE: CPT

## 2022-08-31 PROCEDURE — 74177 CT ABD & PELVIS W/CONTRAST: CPT

## 2022-08-31 RX ORDER — ONDANSETRON 2 MG/ML
4 INJECTION INTRAMUSCULAR; INTRAVENOUS
Status: COMPLETED | OUTPATIENT
Start: 2022-08-31 | End: 2022-08-31

## 2022-08-31 RX ORDER — DICYCLOMINE HYDROCHLORIDE 10 MG/ML
10 INJECTION INTRAMUSCULAR
Status: DISCONTINUED | OUTPATIENT
Start: 2022-08-31 | End: 2022-09-01 | Stop reason: HOSPADM

## 2022-08-31 RX ORDER — DICYCLOMINE HYDROCHLORIDE 10 MG/1
10 CAPSULE ORAL 3 TIMES DAILY
Qty: 21 CAPSULE | Refills: 0 | Status: SHIPPED | OUTPATIENT
Start: 2022-08-31 | End: 2022-09-07

## 2022-08-31 RX ADMIN — ONDANSETRON 4 MG: 2 INJECTION INTRAMUSCULAR; INTRAVENOUS at 19:18

## 2022-08-31 RX ADMIN — DICYCLOMINE HYDROCHLORIDE 10 MG: 20 INJECTION, SOLUTION INTRAMUSCULAR at 21:14

## 2022-08-31 RX ADMIN — IOPAMIDOL 100 ML: 755 INJECTION, SOLUTION INTRAVENOUS at 19:47

## 2022-08-31 RX ADMIN — ALUMINUM HYDROXIDE, MAGNESIUM HYDROXIDE, AND SIMETHICONE 40 ML: 200; 200; 20 SUSPENSION ORAL at 21:14

## 2022-08-31 NOTE — ED NOTES
Verbal shift change report given to Faye Laws (oncoming nurse) by Gabe Carter RN (offgoing nurse). Report included the following information SBAR, ED Summary, MAR and Recent Results.

## 2022-08-31 NOTE — ED TRIAGE NOTES
Pt to ER with c/o centralized upper abd pain since yesterday that started after eating pizza. Pt reports pain radiates to the RUQ with deep breaths. Pt reports mild nausea that started while walking into the ER. Pt reports she also attempted to eat today at 1300 and had an episode of diarrhea.  Pt reports she has had cold symptoms since Saturday and sts she had a negative home covid test.

## 2022-08-31 NOTE — ED PROVIDER NOTES
Date of Service:  2022    Patient:  Evangelist Blount    Chief Complaint:  Abdominal Pain       HPI:  Evangelist Blount is a 52 y.o.  female who presents for evaluation of abdominal pain. Patient states that yesterday after she had pizza for lunch she began with abdominal pain. 7 out of 10 right upper quadrant discomfort that started about an hour later. Nausea without vomiting. She has had some diarrhea as well and notes that today after she ate lunch she had some additional pain. No chest pain shortness of breath fevers or chills. She otherwise denies other acute complaints. History of appendectomy. She still has her gallbladder. Past Medical History:   Diagnosis Date    ADHD (attention deficit hyperactivity disorder)     Endometriosis     Fibroids     Fibromyalgia     Hx of mammogram 2022    BI-RADS 1: Negative.  No mammographic evidence of malignancy    Infertility, female     Screening for malignant neoplasm of the cervix 2019    Negative ; HPV Negative       Past Surgical History:   Procedure Laterality Date    HX APPENDECTOMY      HX COLONOSCOPY      HX DILATION AND CURETTAGE      HX HYSTEROSCOPY WITH ENDOMETRIAL ABLATION      HX ORTHOPAEDIC  2013    left foot surgery cyst removed    HX [place]ADISE AND BSO  07    due to endometriosis    HX TOTAL COLECTOMY           Family History:   Problem Relation Age of Onset    Alcohol abuse Other     Arthritis-rheumatoid Other     Stroke Other     Cancer Other         bladder and tongue    Stroke Maternal Grandmother        Social History     Socioeconomic History    Marital status:      Spouse name: Not on file    Number of children: Not on file    Years of education: Not on file    Highest education level: Not on file   Occupational History    Not on file   Tobacco Use    Smoking status: Former     Types: Cigarettes     Quit date: 2017     Years since quittin.1    Smokeless tobacco: Never    Tobacco comments: Vapes currently   Substance and Sexual Activity    Alcohol use: No     Alcohol/week: 0.0 standard drinks    Drug use: No    Sexual activity: Yes     Partners: Male     Birth control/protection: Surgical   Other Topics Concern    Not on file   Social History Narrative    Not on file     Social Determinants of Health     Financial Resource Strain: Not on file   Food Insecurity: Not on file   Transportation Needs: Not on file   Physical Activity: Not on file   Stress: Not on file   Social Connections: Not on file   Intimate Partner Violence: Not on file   Housing Stability: Not on file         ALLERGIES: Phenergan [promethazine] and Qsymia [phentermine-topiramate]    Review of Systems   Gastrointestinal:  Positive for abdominal pain, diarrhea and nausea. Negative for vomiting. All other systems reviewed and are negative. Vitals:    08/31/22 1836   BP: (!) 141/95   Pulse: 89   Resp: 18   Temp: 98.3 °F (36.8 °C)   SpO2: 100%   Weight: 93.4 kg (206 lb)   Height: 5' 8\" (1.727 m)            Physical Exam  Vitals and nursing note reviewed. Constitutional:       General: She is not in acute distress. Appearance: She is well-developed. She is not ill-appearing. HENT:      Head: Normocephalic and atraumatic. Nose: Nose normal.      Mouth/Throat:      Mouth: Mucous membranes are moist.   Eyes:      General: No scleral icterus. Neck:      Vascular: No JVD. Trachea: No tracheal deviation. Cardiovascular:      Rate and Rhythm: Normal rate and regular rhythm. Pulmonary:      Effort: Pulmonary effort is normal. No respiratory distress. Abdominal:      General: Abdomen is flat. There is no distension. Palpations: Abdomen is soft. Tenderness: There is abdominal tenderness. Positive signs include Nunez's sign. Negative signs include McBurney's sign. Musculoskeletal:         General: No deformity. Skin:     General: Skin is warm.       Capillary Refill: Capillary refill takes less than 2 seconds. Findings: No rash. Neurological:      Mental Status: She is alert and oriented to person, place, and time. Psychiatric:         Mood and Affect: Mood normal.         Behavior: Behavior normal.        MDM     VITAL SIGNS:  Patient Vitals for the past 4 hrs:   Pulse Resp BP SpO2   08/31/22 2111 74 18 103/73 99 %         LABS:  Recent Results (from the past 6 hour(s))   CBC WITH AUTOMATED DIFF    Collection Time: 08/31/22  6:57 PM   Result Value Ref Range    WBC 6.7 3.6 - 11.0 K/uL    RBC 4.57 3.80 - 5.20 M/uL    HGB 14.2 11.5 - 16.0 g/dL    HCT 42.6 35.0 - 47.0 %    MCV 93.2 80.0 - 99.0 FL    MCH 31.1 26.0 - 34.0 PG    MCHC 33.3 30.0 - 36.5 g/dL    RDW 12.5 11.5 - 14.5 %    PLATELET 933 498 - 521 K/uL    MPV 9.9 8.9 - 12.9 FL    NRBC 0.0 0  WBC    ABSOLUTE NRBC 0.00 0.00 - 0.01 K/uL    NEUTROPHILS 50 32 - 75 %    LYMPHOCYTES 39 12 - 49 %    MONOCYTES 10 5 - 13 %    EOSINOPHILS 1 0 - 7 %    BASOPHILS 1 0 - 1 %    IMMATURE GRANULOCYTES 0 0.0 - 0.5 %    ABS. NEUTROPHILS 3.3 1.8 - 8.0 K/UL    ABS. LYMPHOCYTES 2.6 0.8 - 3.5 K/UL    ABS. MONOCYTES 0.6 0.0 - 1.0 K/UL    ABS. EOSINOPHILS 0.1 0.0 - 0.4 K/UL    ABS. BASOPHILS 0.0 0.0 - 0.1 K/UL    ABS. IMM. GRANS. 0.0 0.00 - 0.04 K/UL    DF AUTOMATED     METABOLIC PANEL, COMPREHENSIVE    Collection Time: 08/31/22  6:57 PM   Result Value Ref Range    Sodium 139 136 - 145 mmol/L    Potassium 4.9 3.5 - 5.1 mmol/L    Chloride 103 98 - 107 mmol/L    CO2 27 22 - 29 mmol/L    Anion gap 9 5 - 15 mmol/L    Glucose 107 (H) 65 - 100 mg/dL    BUN 16 6 - 20 MG/DL    Creatinine 0.83 0.50 - 0.90 MG/DL    BUN/Creatinine ratio 19 12 - 20      GFR est AA >60 >60 ml/min/1.73m2    GFR est non-AA >60 >60 ml/min/1.73m2    Calcium 10.1 (H) 8.6 - 10.0 MG/DL    Bilirubin, total 0.2 0.2 - 1.0 MG/DL    ALT (SGPT) 26 10 - 35 U/L    AST (SGOT) 17 10 - 35 U/L    Alk.  phosphatase 128 (H) 35 - 104 U/L    Protein, total 7.4 6.4 - 8.3 g/dL    Albumin 5.0 3.5 - 5.2 g/dL    Globulin 2.4 2.0 - 4.0 g/dL    A-G Ratio 2.1 1.1 - 2.2     URINALYSIS W/MICROSCOPIC    Collection Time: 08/31/22  6:57 PM   Result Value Ref Range    Color YELLOW/STRAW      Appearance CLEAR CLEAR      Specific gravity 1.015 1.003 - 1.030      pH (UA) 5.5 5.0 - 8.0      Protein Negative NEG mg/dL    Glucose Negative NEG mg/dL    Ketone Negative NEG mg/dL    Bilirubin Negative NEG      Blood Negative NEG      Urobilinogen 0.2 0.2 - 1.0 EU/dL    Nitrites Negative NEG      Leukocyte Esterase Negative NEG      WBC 0-4 0 - 4 /hpf    RBC 0-5 /hpf    Epithelial cells FEW FEW /lpf    Bacteria Negative NEG /hpf   URINE CULTURE HOLD SAMPLE    Collection Time: 08/31/22  6:57 PM    Specimen: Serum; Urine   Result Value Ref Range    Urine culture hold        Urine on hold in Microbiology dept for 2 days. If unpreserved urine is submitted, it cannot be used for addtional testing after 24 hours, recollection will be required. LIPASE    Collection Time: 08/31/22  6:57 PM   Result Value Ref Range    Lipase 29 13 - 60 U/L   HCG URINE, QL. - POC    Collection Time: 08/31/22  7:01 PM   Result Value Ref Range    Pregnancy test,urine (POC) Negative NEG          IMAGING:  CT ABD PELV W CONT   Final Result   No bowel obstruction, ileus or perforation. No intra-abdominal   abscess. Medications During Visit:  Medications   dicyclomine (BENTYL) 10 mg/mL injection 10 mg (10 mg IntraMUSCular Given 8/31/22 2114)   ondansetron (ZOFRAN) injection 4 mg (4 mg IntraVENous Given 8/31/22 1918)   iopamidoL (ISOVUE-370) 76 % injection 100 mL (100 mL IntraVENous Given 8/31/22 1947)   mylanta/viscous lidocaine (GI COCKTAIL) (40 mL Oral Given 8/31/22 2114)         DECISION MAKING:  Milton Zambrano is a 52 y.o. female who comes in as above. Here, patient appears uncomfortable. Diagnostics as above with pain relieved after Bentyl. Unclear if this is truly biliary colic versus abdominal cramping. We will have patient follow-up with GI.   Bentyl provided      IMPRESSION:  1. Right upper quadrant abdominal tenderness without rebound tenderness        DISPOSITION:  Discharged      Discharge Medication List as of 8/31/2022  9:26 PM        START taking these medications    Details   dicyclomine (BENTYL) 10 mg capsule Take 1 Capsule by mouth three (3) times daily for 7 days. , Normal, Disp-21 Capsule, R-0           CONTINUE these medications which have NOT CHANGED    Details   escitalopram oxalate (LEXAPRO) 10 mg tablet Take 1.5 Tablets by mouth daily. , Normal, Disp-135 Tablet, R-3      methocarbamoL (ROBAXIN) 750 mg tablet Take 1 Tablet by mouth three (3) times daily. , Normal, Disp-90 Tablet, R-1      ALPRAZolam (XANAX) 0.25 mg tablet Take 1 Tablet by mouth two (2) times daily as needed for Anxiety. Max Daily Amount: 0.5 mg. TAKE 1 TABLET BY MOUTH TWICE DAILY AS NEEDED FOR ANXIETY. MAX DAILY AMOUNT: 0.5 MG-, Normal, Disp-30 Tablet, R-2      ketorolac (TORADOL) 10 mg tablet TAKE 1 TABLET BY MOUTH EVERY 6 HOURS AS NEEDED FOR PAIN, Normal, Disp-10 Tablet, R-0      celecoxib (CELEBREX) 200 mg capsule TAKE 1 CAPSULE BY MOUTH TWICE DAILY, Normal, Disp-60 Capsule, R-5      gabapentin (NEURONTIN) 400 mg capsule TAKE 1 CAPSULE BY MOUTH FOUR TIMES DAILY. MAX DAILY AMOUNT: 1600 MG, Normal, Disp-120 Capsule, R-3      rosuvastatin (CRESTOR) 5 mg tablet Take 1 Tablet by mouth nightly., Normal, Disp-90 Tablet, R-3              Follow-up Information       Follow up With Specialties Details Why Contact Info    Alvarez Ward MD Family Medicine Schedule an appointment as soon as possible for a visit   [address] [place] [address]  [phone]      Consuelo Norman MD Gastroenterology Call  As needed [address] [facility]  [phone]                The patient is asked to follow-up with their primary care provider in the next several days. They are to call tomorrow for an appointment.   The patient is asked to return promptly for any increased concerns or worsening of symptoms. They can return to this emergency department or any other emergency department.       Procedures

## 2022-09-01 NOTE — ED NOTES
I have reviewed discharge instructions with the patient. The patient verbalized understanding. Patient ambulatory from ED with steady even gait and NAD noted.

## 2022-09-29 DIAGNOSIS — G43.909 MIGRAINE WITHOUT STATUS MIGRAINOSUS, NOT INTRACTABLE, UNSPECIFIED MIGRAINE TYPE: ICD-10-CM

## 2022-09-29 RX ORDER — KETOROLAC TROMETHAMINE 10 MG/1
10 TABLET, FILM COATED ORAL
Qty: 10 TABLET | Refills: 0 | Status: SHIPPED | OUTPATIENT
Start: 2022-09-29

## 2022-10-30 DIAGNOSIS — M79.7 FIBROMYALGIA: ICD-10-CM

## 2022-10-30 DIAGNOSIS — M45.9 ANKYLOSING SPONDYLITIS, UNSPECIFIED SITE OF SPINE (HCC): ICD-10-CM

## 2022-10-31 RX ORDER — METHOCARBAMOL 750 MG/1
750 TABLET, FILM COATED ORAL 3 TIMES DAILY
Qty: 90 TABLET | Refills: 1 | Status: SHIPPED | OUTPATIENT
Start: 2022-10-31

## 2022-10-31 RX ORDER — GABAPENTIN 400 MG/1
CAPSULE ORAL
Qty: 120 CAPSULE | Refills: 3 | Status: SHIPPED | OUTPATIENT
Start: 2022-10-31

## 2022-12-06 DIAGNOSIS — G43.909 MIGRAINE WITHOUT STATUS MIGRAINOSUS, NOT INTRACTABLE, UNSPECIFIED MIGRAINE TYPE: ICD-10-CM

## 2022-12-07 RX ORDER — KETOROLAC TROMETHAMINE 10 MG/1
TABLET, FILM COATED ORAL
Qty: 10 TABLET | Refills: 0 | Status: SHIPPED | OUTPATIENT
Start: 2022-12-07

## 2022-12-15 RX ORDER — BENZONATATE 200 MG/1
200 CAPSULE ORAL
Qty: 30 CAPSULE | Refills: 1 | Status: SHIPPED | OUTPATIENT
Start: 2022-12-15

## 2022-12-20 ENCOUNTER — PATIENT OUTREACH (OUTPATIENT)
Dept: CASE MANAGEMENT | Age: 47
End: 2022-12-20

## 2022-12-20 NOTE — PROGRESS NOTES
Ambulatory Care Management Note    Date/Time:  12/20/2022 10:38 AM    This patient was received as a referral from Provider. Ambulatory Care Manager outreached to patient today to offer care management services. Introduction to self and role of care manager provided. Patient declined care management services at this time. No follow up call scheduled at this time. Patient has Ambulatory Care Manager's contact number for any questions or concerns.

## 2022-12-21 ENCOUNTER — VIRTUAL VISIT (OUTPATIENT)
Dept: FAMILY MEDICINE CLINIC | Age: 47
End: 2022-12-21
Payer: COMMERCIAL

## 2022-12-21 DIAGNOSIS — M45.9 ANKYLOSING SPONDYLITIS, UNSPECIFIED SITE OF SPINE (HCC): ICD-10-CM

## 2022-12-21 DIAGNOSIS — F41.9 ANXIETY: ICD-10-CM

## 2022-12-21 DIAGNOSIS — M79.7 FIBROMYALGIA: ICD-10-CM

## 2022-12-21 DIAGNOSIS — G43.909 MIGRAINE WITHOUT STATUS MIGRAINOSUS, NOT INTRACTABLE, UNSPECIFIED MIGRAINE TYPE: ICD-10-CM

## 2022-12-21 PROCEDURE — 99214 OFFICE O/P EST MOD 30 MIN: CPT | Performed by: NURSE PRACTITIONER

## 2022-12-21 RX ORDER — ALPRAZOLAM 0.25 MG/1
0.25 TABLET ORAL
Qty: 30 TABLET | Refills: 2 | Status: SHIPPED | OUTPATIENT
Start: 2022-12-21

## 2022-12-21 RX ORDER — KETOROLAC TROMETHAMINE 10 MG/1
10 TABLET, FILM COATED ORAL
Qty: 20 TABLET | Refills: 0 | Status: SHIPPED | OUTPATIENT
Start: 2022-12-21

## 2022-12-21 RX ORDER — MELOXICAM 15 MG/1
TABLET ORAL
COMMUNITY
Start: 2022-12-07

## 2022-12-21 RX ORDER — GABAPENTIN 400 MG/1
CAPSULE ORAL
Qty: 120 CAPSULE | Refills: 3 | Status: SHIPPED | OUTPATIENT
Start: 2022-12-21

## 2022-12-21 NOTE — PROGRESS NOTES
Chief Complaint   Patient presents with    Medication Refill     Pt being seen for med refill  -meds pended    Pt states that she has been seeing an ortho dr  -pt states she was started on mobic and that she stopped celebrex    Pt wants to discuss cholesterol     1. Have you been to the ER, urgent care clinic since your last visit? Hospitalized since your last visit? No    2. Have you seen or consulted any other health care providers outside of the [address] [place] [address] since your last visit? Include any pap smears or colon screening.   ortho    Pt has no other concerns

## 2022-12-21 NOTE — PROGRESS NOTES
Aj Gomez (: 1975) is a 52 y.o. female, established patient, here for evaluation of the following chief complaint(s):   Medication Refill       ASSESSMENT/PLAN:  Below is the assessment and plan developed based on review of pertinent history, labs, studies, and medications. 1. Anxiety  -     ALPRAZolam (XANAX) 0.25 mg tablet; Take 1 Tablet by mouth two (2) times daily as needed for Anxiety. Max Daily Amount: 0.5 mg. TAKE 1 TABLET BY MOUTH TWICE DAILY AS NEEDED FOR ANXIETY. MAX DAILY AMOUNT: 0.5 MG-, Normal, Disp-30 Tablet, R-2  2. Fibromyalgia  -     gabapentin (NEURONTIN) 400 mg capsule; TAKE 1 CAPSULE BY MOUTH FOUR TIMES DAILY. MAX DAILY AMOUNT: 1600 MG, Normal, Disp-120 Capsule, R-3  3. Ankylosing spondylitis, unspecified site of spine (HCC)  -     gabapentin (NEURONTIN) 400 mg capsule; TAKE 1 CAPSULE BY MOUTH FOUR TIMES DAILY. MAX DAILY AMOUNT: 1600 MG, Normal, Disp-120 Capsule, R-3  4. Migraine without status migrainosus, not intractable, unspecified migraine type  -     ketorolac (TORADOL) 10 mg tablet; Take 1 Tablet by mouth every six (6) hours as needed for Pain., Normal, Disp-20 Tablet, R-0    Cont plan to see Ortho  Med list updated to reflect new regimen  Follow up 3 mo    No follow-ups on file.     SUBJECTIVE/OBJECTIVE:  HPI  Patient has been [facility] pain  Recent injection both shoulders and has done well  Also changed to mobic from celebrex and working better  Needs refill of her anxiety and fibro meds  Migraines have been manageable    Review of Systems   A comprehensive review of system was obtained and negative except findings in the HPI    No data recorded     Physical Exam    [INSTRUCTIONS:  \"[x]\" Indicates a positive item  \"[]\" Indicates a negative item  -- DELETE ALL ITEMS NOT EXAMINED]    Constitutional: [x] Appears well-developed and well-nourished [x] No apparent distress      [] Abnormal -     Mental status: [x] Alert and awake  [x] Oriented to person/place/time [x] Able to follow commands    [] Abnormal -     Eyes:   EOM    [x]  Normal    [] Abnormal -   Sclera  [x]  Normal    [] Abnormal -          Discharge [x]  None visible   [] Abnormal -     HENT: [x] Normocephalic, atraumatic  [] Abnormal -   [x] Mouth/Throat: Mucous membranes are moist    External Ears [x] Normal  [] Abnormal -    Neck: [x] No visualized mass [] Abnormal -     Pulmonary/Chest: [x] Respiratory effort normal   [x] No visualized signs of difficulty breathing or respiratory distress        [] Abnormal -      Musculoskeletal:   [x] Normal gait with no signs of ataxia         [x] Normal range of motion of neck        [] Abnormal -     Neurological:        [x] No Facial Asymmetry (Cranial nerve 7 motor function) (limited exam due to video visit)          [x] No gaze palsy        [] Abnormal -          Skin:        [x] No significant exanthematous lesions or discoloration noted on facial skin         [] Abnormal -            Psychiatric:       [x] Normal Affect [] Abnormal -        [x] No Hallucinations    Other pertinent observable physical exam findings:-    On this date 12/21/2022 I have spent 20 minutes reviewing previous notes, test results and face to face (virtual) with the patient discussing the diagnosis and importance of compliance with the treatment plan as well as documenting on the day of the visitCritical access hospital, was evaluated through a synchronous (real-time) audio-video encounter. The patient (or guardian if applicable) is aware that this is a billable service, which includes applicable co-pays. This Virtual Visit was conducted with patient's (and/or legal guardian's) consent. The visit was conducted pursuant to the emergency declaration under the [address] [place] [address], [address] [place] [address] authority and the [employer] and [employer] General Act.   Patient identification was verified, and a caregiver was present when appropriate. The patient was located at: Home: [address] [facility] [address]  The provider was located at: Home: [unfilled]       An electronic signature was used to authenticate this note.   -- Kayy Rojo NP

## 2023-01-05 RX ORDER — METHOCARBAMOL 750 MG/1
TABLET, FILM COATED ORAL
Qty: 90 TABLET | Refills: 1 | Status: SHIPPED | OUTPATIENT
Start: 2023-01-05

## 2023-01-30 RX ORDER — MELOXICAM 15 MG/1
15 TABLET ORAL DAILY
Qty: 90 TABLET | Refills: 3 | Status: SHIPPED | OUTPATIENT
Start: 2023-01-30

## 2023-03-15 DIAGNOSIS — F41.9 ANXIETY: ICD-10-CM

## 2023-03-16 RX ORDER — ALPRAZOLAM 0.25 MG/1
0.25 TABLET ORAL
Qty: 30 TABLET | Refills: 2 | Status: SHIPPED | OUTPATIENT
Start: 2023-03-16

## 2023-04-20 DIAGNOSIS — G43.909 MIGRAINE WITHOUT STATUS MIGRAINOSUS, NOT INTRACTABLE, UNSPECIFIED MIGRAINE TYPE: ICD-10-CM

## 2023-04-20 RX ORDER — METHOCARBAMOL 750 MG/1
750 TABLET, FILM COATED ORAL 3 TIMES DAILY
Qty: 90 TABLET | Refills: 1 | Status: SHIPPED | OUTPATIENT
Start: 2023-04-20

## 2023-04-20 RX ORDER — KETOROLAC TROMETHAMINE 10 MG/1
10 TABLET, FILM COATED ORAL
Qty: 20 TABLET | Refills: 0 | Status: SHIPPED | OUTPATIENT
Start: 2023-04-20

## 2023-05-22 SDOH — ECONOMIC STABILITY: TRANSPORTATION INSECURITY
IN THE PAST 12 MONTHS, HAS LACK OF TRANSPORTATION KEPT YOU FROM MEETINGS, WORK, OR FROM GETTING THINGS NEEDED FOR DAILY LIVING?: NO

## 2023-05-22 SDOH — ECONOMIC STABILITY: FOOD INSECURITY: WITHIN THE PAST 12 MONTHS, THE FOOD YOU BOUGHT JUST DIDN'T LAST AND YOU DIDN'T HAVE MONEY TO GET MORE.: NEVER TRUE

## 2023-05-22 SDOH — ECONOMIC STABILITY: HOUSING INSECURITY
IN THE LAST 12 MONTHS, WAS THERE A TIME WHEN YOU DID NOT HAVE A STEADY PLACE TO SLEEP OR SLEPT IN A SHELTER (INCLUDING NOW)?: NO

## 2023-05-22 SDOH — ECONOMIC STABILITY: INCOME INSECURITY: HOW HARD IS IT FOR YOU TO PAY FOR THE VERY BASICS LIKE FOOD, HOUSING, MEDICAL CARE, AND HEATING?: NOT HARD AT ALL

## 2023-05-22 SDOH — ECONOMIC STABILITY: FOOD INSECURITY: WITHIN THE PAST 12 MONTHS, YOU WORRIED THAT YOUR FOOD WOULD RUN OUT BEFORE YOU GOT MONEY TO BUY MORE.: NEVER TRUE

## 2023-05-23 ENCOUNTER — TELEMEDICINE (OUTPATIENT)
Age: 48
End: 2023-05-23
Payer: COMMERCIAL

## 2023-05-23 DIAGNOSIS — F41.9 ANXIETY DISORDER, UNSPECIFIED TYPE: Primary | ICD-10-CM

## 2023-05-23 PROCEDURE — 99213 OFFICE O/P EST LOW 20 MIN: CPT | Performed by: NURSE PRACTITIONER

## 2023-05-23 RX ORDER — SCOLOPAMINE TRANSDERMAL SYSTEM 1 MG/1
1 PATCH, EXTENDED RELEASE TRANSDERMAL
Qty: 4 PATCH | Refills: 0 | Status: SHIPPED | OUTPATIENT
Start: 2023-05-23

## 2023-05-23 RX ORDER — ALPRAZOLAM 0.25 MG/1
0.25 TABLET ORAL 2 TIMES DAILY PRN
Qty: 60 TABLET | Refills: 1 | Status: SHIPPED | OUTPATIENT
Start: 2023-05-23 | End: 2023-06-22

## 2023-05-23 RX ORDER — MAGNESIUM GLYCINATE 100 MG
CAPSULE ORAL
COMMUNITY

## 2023-05-23 ASSESSMENT — PATIENT HEALTH QUESTIONNAIRE - PHQ9
2. FEELING DOWN, DEPRESSED OR HOPELESS: 0
SUM OF ALL RESPONSES TO PHQ9 QUESTIONS 1 & 2: 0
SUM OF ALL RESPONSES TO PHQ QUESTIONS 1-9: 0
1. LITTLE INTEREST OR PLEASURE IN DOING THINGS: 0

## 2023-05-23 ASSESSMENT — ANXIETY QUESTIONNAIRES
4. TROUBLE RELAXING: 0
5. BEING SO RESTLESS THAT IT IS HARD TO SIT STILL: 0
GAD7 TOTAL SCORE: 0
6. BECOMING EASILY ANNOYED OR IRRITABLE: 0
7. FEELING AFRAID AS IF SOMETHING AWFUL MIGHT HAPPEN: 0
3. WORRYING TOO MUCH ABOUT DIFFERENT THINGS: 0
1. FEELING NERVOUS, ANXIOUS, OR ON EDGE: 0
2. NOT BEING ABLE TO STOP OR CONTROL WORRYING: 0

## 2023-05-23 NOTE — PROGRESS NOTES
Chief Complaint   Patient presents with    Medication Refill     Pt being seen for med refill  -xanax    Pt states that she is going on a cruise and wants to have a patch called in      1. Have you been to the ER, urgent care clinic since your last visit? Hospitalized since your last visit? No    2. Have you seen or consulted any other health care providers outside of the [address] [place] [address] since your last visit? Include any pap smears or colon screening. No    Who is the primary learner? Patient    What is the preferred language for health care of the primary learner? ENGLISH    How does the primary learner prefer to learn new concepts?  DEMONSTRATION    Answered By patient    Relationship to Learner SELF      Depression: Not at risk    PHQ-2 Score: 0         Pt has no other concerns

## 2023-05-23 NOTE — PROGRESS NOTES
[place] (:  1975) is a Established patient, presenting virtually for evaluation of the following:    Assessment & Plan   Below is the assessment and plan developed based on review of pertinent history, physical exam, labs, studies, and medications. Dario Quiros was seen today for medication refill. Diagnoses and all orders for this visit:    Anxiety disorder, unspecified type  -     ALPRAZolam (XANAX) 0.25 MG tablet; Take 1 tablet by mouth 2 times daily as needed for Anxiety for up to 30 days. Max Daily Amount: 0.5 mg    Motion Sickness  -     scopolamine (TRANSDERM SCOP, 1.5 MG,) transdermal patch; Place 1 patch onto the skin every 72 hours    Refills updated  Given patches for cruise, reviewed how to use    No follow-ups on file.        Subjective   HPI  Due for refills of xanax  No adr/se of med noted  Takes sparingly    Going on cruise and needs a motion sickness patch    Review of Systems   A comprehensive review of system was obtained and negative except findings in the HPI      Objective   Patient-Reported Vitals  No data recorded     Physical Exam  [INSTRUCTIONS:  \"[x]\" Indicates a positive item  \"[]\" Indicates a negative item  -- DELETE ALL ITEMS NOT EXAMINED]    Constitutional: [x] Appears well-developed and well-nourished [x] No apparent distress      [] Abnormal -     Mental status: [x] Alert and awake  [x] Oriented to person/place/time [x] Able to follow commands    [] Abnormal -     Eyes:   EOM    [x]  Normal    [] Abnormal -   Sclera  [x]  Normal    [] Abnormal -          Discharge [x]  None visible   [] Abnormal -     HENT: [x] Normocephalic, atraumatic  [] Abnormal -   [x] Mouth/Throat: Mucous membranes are moist    External Ears [x] Normal  [] Abnormal -    Neck: [x] No visualized mass [] Abnormal -     Pulmonary/Chest: [x] Respiratory effort normal   [x] No visualized signs of difficulty breathing or respiratory distress        [] Abnormal -      Musculoskeletal:   [x] Normal

## 2023-06-22 RX ORDER — ROSUVASTATIN CALCIUM 5 MG/1
TABLET, COATED ORAL
Qty: 90 TABLET | Refills: 1 | Status: SHIPPED | OUTPATIENT
Start: 2023-06-22

## 2023-08-01 DIAGNOSIS — M79.7 FIBROMYALGIA: Primary | ICD-10-CM

## 2023-08-01 RX ORDER — GABAPENTIN 400 MG/1
CAPSULE ORAL
Qty: 90 CAPSULE | Refills: 5 | Status: SHIPPED | OUTPATIENT
Start: 2023-08-01 | End: 2023-08-29

## 2023-08-01 RX ORDER — METHOCARBAMOL 750 MG/1
750 TABLET, FILM COATED ORAL 3 TIMES DAILY
Qty: 90 TABLET | Refills: 2 | Status: SHIPPED | OUTPATIENT
Start: 2023-08-01

## 2023-08-01 RX ORDER — KETOROLAC TROMETHAMINE 10 MG/1
10 TABLET, FILM COATED ORAL EVERY 6 HOURS PRN
Qty: 20 TABLET | Refills: 2 | Status: SHIPPED | OUTPATIENT
Start: 2023-08-01

## 2023-08-03 DIAGNOSIS — F41.9 ANXIETY DISORDER, UNSPECIFIED TYPE: Primary | ICD-10-CM

## 2023-08-03 RX ORDER — ALPRAZOLAM 0.25 MG/1
0.25 TABLET ORAL 2 TIMES DAILY PRN
Qty: 30 TABLET | Refills: 0 | Status: SHIPPED | OUTPATIENT
Start: 2023-08-03 | End: 2023-09-02

## 2023-09-06 ENCOUNTER — TELEMEDICINE (OUTPATIENT)
Age: 48
End: 2023-09-06
Payer: COMMERCIAL

## 2023-09-06 DIAGNOSIS — F41.9 ANXIETY DISORDER, UNSPECIFIED TYPE: Primary | ICD-10-CM

## 2023-09-06 PROCEDURE — 99213 OFFICE O/P EST LOW 20 MIN: CPT | Performed by: NURSE PRACTITIONER

## 2023-09-06 RX ORDER — ALPRAZOLAM 0.25 MG/1
0.25 TABLET ORAL 2 TIMES DAILY PRN
Qty: 60 TABLET | Refills: 2 | Status: SHIPPED | OUTPATIENT
Start: 2023-09-06 | End: 2023-10-06

## 2023-09-08 NOTE — PROGRESS NOTES
Normal  [] Abnormal -    Neck: [x] No visualized mass [] Abnormal -     Pulmonary/Chest: [x] Respiratory effort normal   [x] No visualized signs of difficulty breathing or respiratory distress        [] Abnormal -      Musculoskeletal:   [x] Normal gait with no signs of ataxia         [x] Normal range of motion of neck        [] Abnormal -     Neurological:        [x] No Facial Asymmetry (Cranial nerve 7 motor function) (limited exam due to video visit)          [x] No gaze palsy        [] Abnormal -          Skin:        [x] No significant exanthematous lesions or discoloration noted on facial skin         [] Abnormal -            Psychiatric:       [x] Normal Affect [] Abnormal -        [x] No Hallucinations    Other pertinent observable physical exam findings:-         On this date 9/6/2023 I have spent 15 minutes reviewing previous notes, test results and face to face (virtual) with the patient discussing the diagnosis and importance of compliance with the treatment plan as well as documenting on the day of the visit.     --ROBIN Kim - NP

## 2023-10-03 RX ORDER — ROSUVASTATIN CALCIUM 5 MG/1
5 TABLET, COATED ORAL NIGHTLY
Qty: 90 TABLET | Refills: 1 | Status: SHIPPED | OUTPATIENT
Start: 2023-10-03

## 2023-10-16 ENCOUNTER — OFFICE VISIT (OUTPATIENT)
Age: 48
End: 2023-10-16
Payer: COMMERCIAL

## 2023-10-16 VITALS
BODY MASS INDEX: 26.22 KG/M2 | HEIGHT: 68 IN | DIASTOLIC BLOOD PRESSURE: 79 MMHG | HEART RATE: 77 BPM | RESPIRATION RATE: 18 BRPM | OXYGEN SATURATION: 97 % | WEIGHT: 173 LBS | SYSTOLIC BLOOD PRESSURE: 114 MMHG | TEMPERATURE: 98.5 F

## 2023-10-16 DIAGNOSIS — Z00.00 WELL EXAM WITHOUT ABNORMAL FINDINGS OF PATIENT 18 YEARS OF AGE OR OLDER: Primary | ICD-10-CM

## 2023-10-16 PROCEDURE — 99396 PREV VISIT EST AGE 40-64: CPT | Performed by: NURSE PRACTITIONER

## 2023-10-16 NOTE — PROGRESS NOTES
Chief Complaint   Patient presents with    Annual Exam     Patient presents in office today for CPE. No concerns. 1. \"Have you been to the ER, urgent care clinic since your last visit? Hospitalized since your last visit? \" No    2. \"Have you seen or consulted any other health care providers outside of the [address] [place] [address] since your last visit? \" No     3. For patients aged 43-73: Has the patient had a colonoscopy / FIT/ Cologuard? Yes - no Care Gap present      If the patient is female:    4. For patients aged 43-66: Has the patient had a mammogram within the past 2 years? Yes - no Care Gap present      5. For patients aged 21-65: Has the patient had a pap smear?  Yes - no Care Gap present

## 2023-10-17 LAB
ALBUMIN SERPL-MCNC: 4.5 G/DL (ref 3.5–5)
ALBUMIN/GLOB SERPL: 1.7 (ref 1.1–2.2)
ALP SERPL-CCNC: 56 U/L (ref 45–117)
ALT SERPL-CCNC: 26 U/L (ref 12–78)
ANION GAP SERPL CALC-SCNC: 5 MMOL/L (ref 5–15)
AST SERPL-CCNC: 11 U/L (ref 15–37)
BASOPHILS # BLD: 0 K/UL (ref 0–0.1)
BASOPHILS NFR BLD: 0 % (ref 0–1)
BILIRUB SERPL-MCNC: 0.3 MG/DL (ref 0.2–1)
BUN SERPL-MCNC: 13 MG/DL (ref 6–20)
BUN/CREAT SERPL: 16 (ref 12–20)
CALCIUM SERPL-MCNC: 9.4 MG/DL (ref 8.5–10.1)
CHLORIDE SERPL-SCNC: 103 MMOL/L (ref 97–108)
CHOLEST SERPL-MCNC: 164 MG/DL
CO2 SERPL-SCNC: 28 MMOL/L (ref 21–32)
CREAT SERPL-MCNC: 0.81 MG/DL (ref 0.55–1.02)
DIFFERENTIAL METHOD BLD: ABNORMAL
EOSINOPHIL # BLD: 0.1 K/UL (ref 0–0.4)
EOSINOPHIL NFR BLD: 1 % (ref 0–7)
ERYTHROCYTE [DISTWIDTH] IN BLOOD BY AUTOMATED COUNT: 12 % (ref 11.5–14.5)
GLOBULIN SER CALC-MCNC: 2.7 G/DL (ref 2–4)
GLUCOSE SERPL-MCNC: 89 MG/DL (ref 65–100)
HCT VFR BLD AUTO: 43.3 % (ref 35–47)
HDLC SERPL-MCNC: 53 MG/DL
HDLC SERPL: 3.1 (ref 0–5)
HGB BLD-MCNC: 14 G/DL (ref 11.5–16)
IMM GRANULOCYTES # BLD AUTO: 0 K/UL (ref 0–0.04)
IMM GRANULOCYTES NFR BLD AUTO: 0 % (ref 0–0.5)
LDLC SERPL CALC-MCNC: 85.6 MG/DL (ref 0–100)
LYMPHOCYTES # BLD: 1.9 K/UL (ref 0.8–3.5)
LYMPHOCYTES NFR BLD: 25 % (ref 12–49)
MCH RBC QN AUTO: 32.4 PG (ref 26–34)
MCHC RBC AUTO-ENTMCNC: 32.3 G/DL (ref 30–36.5)
MCV RBC AUTO: 100.2 FL (ref 80–99)
MONOCYTES # BLD: 0.7 K/UL (ref 0–1)
MONOCYTES NFR BLD: 9 % (ref 5–13)
NEUTS SEG # BLD: 5 K/UL (ref 1.8–8)
NEUTS SEG NFR BLD: 65 % (ref 32–75)
NRBC # BLD: 0 K/UL (ref 0–0.01)
NRBC BLD-RTO: 0 PER 100 WBC
PLATELET # BLD AUTO: 264 K/UL (ref 150–400)
PMV BLD AUTO: 10.3 FL (ref 8.9–12.9)
POTASSIUM SERPL-SCNC: 4.2 MMOL/L (ref 3.5–5.1)
PROT SERPL-MCNC: 7.2 G/DL (ref 6.4–8.2)
RBC # BLD AUTO: 4.32 M/UL (ref 3.8–5.2)
SODIUM SERPL-SCNC: 136 MMOL/L (ref 136–145)
TRIGL SERPL-MCNC: 127 MG/DL
TSH SERPL DL<=0.05 MIU/L-ACNC: 0.75 UIU/ML (ref 0.36–3.74)
VLDLC SERPL CALC-MCNC: 25.4 MG/DL
WBC # BLD AUTO: 7.7 K/UL (ref 3.6–11)

## 2023-10-21 NOTE — PROGRESS NOTES
10/16/2023    Olivia Dillon (:  1975) is a 50 y.o. female, here for a preventive medicine evaluation. Patient Active Problem List   Diagnosis    Ankylosing spondylitis ([address])    Endometriosis    Migraine    Fibroids    Fibromyalgia    ADHD (attention deficit hyperactivity disorder)     Feeling good no complaints  Loosing weight with diet and exercise  Due for fasting labs    Review of Systems  A comprehensive review of system was obtained and negative except findings in the HPI    Prior to Visit Medications    Medication Sig Taking? Authorizing Provider   rosuvastatin (CRESTOR) 5 MG tablet Take 1 tablet by mouth nightly Yes RBOIN Rodarte NP   gabapentin (NEURONTIN) 400 MG capsule TAKE 1 CAPSULE BY MOUTH FOUR TIMES DAILY. MAX DAILY AMOUNT: 1600 MG Yes ROBIN Rodarte NP   methocarbamol (ROBAXIN) 750 MG tablet Take 1 tablet by mouth 3 times daily Yes ROBIN Rodarte NP   PROGESTERONE PO Take by mouth Yes Provider, MD Abbe   meloxicam (MOBIC) 15 MG tablet Take by mouth daily Yes Automatic Reconciliation, Ar        Allergies   Allergen Reactions    Phentermine-Topiramate Other (See Comments)     Vision loss     Promethazine      Other reaction(s): Unknown (comments)       Past Medical History:   Diagnosis Date    ADHD (attention deficit hyperactivity disorder)     Endometriosis     Fibroids     Fibromyalgia     Hx of mammogram 2022    BI-RADS 1: Negative.  No mammographic evidence of malignancy    Infertility, female     Screening for malignant neoplasm of the cervix 2019    Negative ; HPV Negative       Past Surgical History:   Procedure Laterality Date    APPENDECTOMY      COLONOSCOPY      DILATION AND CURETTAGE OF UTERUS      ENDOMETRIAL ABLATION      ORTHOPEDIC SURGERY  2013    left foot surgery cyst removed    JESSICA AND BSO (CERVIX REMOVED)  07    due to endometriosis    TOTAL COLECTOMY         Social History     Socioeconomic

## 2023-11-13 NOTE — PROGRESS NOTES
Monique Jefferson is a 50 y.o. female returns for an annual exam     Chief Complaint   Patient presents with    Annual Exam       No LMP recorded. Patient has had a hysterectomy. Her periods are nonexistent in flow. Problems: no problems  Birth Control: hysterectomy. Last Pap: normal obtained 11/2019  She does not have a history of GEMMA 2, 3 or cervical cancer. Last Mammogram: had her mammogram today in our office.

## 2023-11-14 ENCOUNTER — OFFICE VISIT (OUTPATIENT)
Age: 48
End: 2023-11-14
Payer: COMMERCIAL

## 2023-11-14 VITALS
DIASTOLIC BLOOD PRESSURE: 82 MMHG | HEART RATE: 84 BPM | BODY MASS INDEX: 25.85 KG/M2 | SYSTOLIC BLOOD PRESSURE: 120 MMHG | WEIGHT: 170 LBS

## 2023-11-14 DIAGNOSIS — Z01.419 ENCOUNTER FOR WELL WOMAN EXAM WITH ROUTINE GYNECOLOGICAL EXAM: Primary | ICD-10-CM

## 2023-11-14 DIAGNOSIS — Z12.4 SCREENING FOR CERVICAL CANCER: ICD-10-CM

## 2023-11-14 PROCEDURE — 99396 PREV VISIT EST AGE 40-64: CPT | Performed by: OBSTETRICS & GYNECOLOGY

## 2023-11-14 NOTE — PROGRESS NOTES
Per Nursing Note:  Maame Dillon is a 50 y.o. female returns for an annual exam          Chief Complaint   Patient presents with    Annual Exam         No LMP recorded. Patient has had a hysterectomy. Her periods are nonexistent in flow. Problems: no problems  Birth Control: hysterectomy. Last Pap: normal obtained 11/2019  She does not have a history of GEMMA 2, 3 or cervical cancer. Last Mammogram: had her mammogram today in our office. Annual exam      Maame Dillon is a No obstetric history on file. ,  50 y.o. female   No LMP recorded. Patient has had a hysterectomy. JESSICA/BSO (2007) for endometriosis  She presents for her annual checkup. LV=4/28/22 AE    Has been on and off ERT including off-label testosterone tx in past.  Was given [address] for [place] patch at AE 1/2021. Used patch for 6-8wks, stopped b/c she did not see improvement in libido. Has long h/o decreased libido. Had been on TT in past, worked initially, then eventually did not. Now doing BHRT pellets through [facility]. Started 7/2022 Thinks her pellets are estrogen and testosterone. Doing Progesterone amadou. Getting them very 10wks. Finally feels like she is on a good dose. Had required dose adjustments. Has lost 50# - dietary changes (smaller portions for breakfast and lunch), moving more. Has 6 month puppy    Menstrual status:    JESSICA/BSO (2007) for endometriosis        Sexual history:    She  reports being sexually active and has had partner(s) who are male. She reports using the following method of birth control/protection: Surgical.      Pap Smear:    H/o JESSICA/BSO for endometriosis. Nl paps -> OK to exit routine screening.       Medical conditions:    Since her last annual GYN exam about 1+ ago, she has had the following changes in her health history:  - started on BHRT pellets      Past Medical History:   Diagnosis Date    ADHD (attention deficit hyperactivity disorder)     Endometriosis     JESSICA/BSO (2007)    Fibroids

## 2023-11-20 RX ORDER — METHOCARBAMOL 750 MG/1
750 TABLET, FILM COATED ORAL 3 TIMES DAILY
Qty: 90 TABLET | Refills: 2 | Status: SHIPPED | OUTPATIENT
Start: 2023-11-20

## 2023-11-27 RX ORDER — MELOXICAM 15 MG/1
15 TABLET ORAL DAILY
Qty: 30 TABLET | Refills: 5 | Status: SHIPPED | OUTPATIENT
Start: 2023-11-27

## 2023-11-30 RX ORDER — AZITHROMYCIN 250 MG/1
250 TABLET, FILM COATED ORAL SEE ADMIN INSTRUCTIONS
Qty: 6 TABLET | Refills: 0 | Status: SHIPPED | OUTPATIENT
Start: 2023-11-30 | End: 2023-12-05

## 2024-01-21 DIAGNOSIS — F41.9 ANXIETY DISORDER, UNSPECIFIED TYPE: ICD-10-CM

## 2024-01-22 RX ORDER — ALPRAZOLAM 0.25 MG/1
TABLET ORAL
Qty: 60 TABLET | Refills: 0 | Status: SHIPPED | OUTPATIENT
Start: 2024-01-22 | End: 2024-02-21

## 2024-01-31 ENCOUNTER — TELEMEDICINE (OUTPATIENT)
Age: 49
End: 2024-01-31
Payer: COMMERCIAL

## 2024-01-31 DIAGNOSIS — F41.9 ANXIETY DISORDER, UNSPECIFIED TYPE: ICD-10-CM

## 2024-01-31 PROCEDURE — 99213 OFFICE O/P EST LOW 20 MIN: CPT | Performed by: NURSE PRACTITIONER

## 2024-01-31 RX ORDER — ALPRAZOLAM 0.25 MG/1
TABLET ORAL
Qty: 60 TABLET | Refills: 2 | Status: SHIPPED | OUTPATIENT
Start: 2024-01-31 | End: 2024-02-28

## 2024-01-31 ASSESSMENT — PATIENT HEALTH QUESTIONNAIRE - PHQ9
SUM OF ALL RESPONSES TO PHQ QUESTIONS 1-9: 0
SUM OF ALL RESPONSES TO PHQ QUESTIONS 1-9: 0
1. LITTLE INTEREST OR PLEASURE IN DOING THINGS: 0
SUM OF ALL RESPONSES TO PHQ QUESTIONS 1-9: 0
SUM OF ALL RESPONSES TO PHQ QUESTIONS 1-9: 0
2. FEELING DOWN, DEPRESSED OR HOPELESS: 0
SUM OF ALL RESPONSES TO PHQ9 QUESTIONS 1 & 2: 0

## 2024-01-31 NOTE — PROGRESS NOTES
María Arndt, was evaluated through a synchronous (real-time) audio-video encounter. The patient (or guardian if applicable) is aware that this is a billable service, which includes applicable co-pays. This Virtual Visit was conducted with patient's (and/or legal guardian's) consent. Patient identification was verified, and a caregiver was present when appropriate.   The patient was located at Home: [address] [place] [id number]  Provider was located at Home ([place]): ANGELIA Arndt (:  1975) is a Established patient, presenting virtually for evaluation of the following:    Assessment & Plan   Below is the assessment and plan developed based on review of pertinent history, physical exam, labs, studies, and medications.  1. Anxiety disorder, unspecified type  -     ALPRAZolam (XANAX) 0.25 MG tablet; TAKE 1 TABLET BY MOUTH TWICE A DAY AS NEEDED FOR ANXIETY FOR UP TO 30 DAYS. MAX DAILY AMOUNT: 2 TABLETS, Disp-60 tablet, R-2Normal    Refills updated today  Will call in 3 weeks for lab slip to take to lab [facility] for b12 level  Dev plan with results    No follow-ups on file.       Subjective   HPI  Due for refill of her xanax refill  Taking as needed for anxiety  Also has been going to [facility] and getting b12 shots  Wants to know if she can get them at our office as rx     Review of Systems   A comprehensive review of system was obtained and negative except findings in the HPI      Objective   Patient-Reported Vitals  No data recorded     Physical Exam  [INSTRUCTIONS:  \"[x]\" Indicates a positive item  \"[]\" Indicates a negative item  -- DELETE ALL ITEMS NOT EXAMINED]    Constitutional: [x] Appears well-developed and well-nourished [x] No apparent distress      [] Abnormal -     Mental status: [x] Alert and awake  [x] Oriented to person/place/time [x] Able to follow commands    [] Abnormal -     Eyes:   EOM    [x]  Normal    [] Abnormal -   Sclera  [x]  Normal    []

## 2024-02-03 DIAGNOSIS — M79.7 FIBROMYALGIA: ICD-10-CM

## 2024-02-05 RX ORDER — GABAPENTIN 400 MG/1
CAPSULE ORAL
Qty: 120 CAPSULE | Refills: 2 | Status: SHIPPED | OUTPATIENT
Start: 2024-02-05 | End: 2024-03-04

## 2024-03-18 RX ORDER — METHOCARBAMOL 750 MG/1
750 TABLET, FILM COATED ORAL 3 TIMES DAILY
Qty: 90 TABLET | Refills: 2 | Status: SHIPPED | OUTPATIENT
Start: 2024-03-18

## 2024-04-01 RX ORDER — ROSUVASTATIN CALCIUM 5 MG/1
5 TABLET, COATED ORAL NIGHTLY
Qty: 90 TABLET | Refills: 1 | Status: SHIPPED | OUTPATIENT
Start: 2024-04-01

## 2024-05-16 RX ORDER — BENZONATATE 200 MG/1
200 CAPSULE ORAL 3 TIMES DAILY PRN
Qty: 30 CAPSULE | Refills: 0 | Status: SHIPPED | OUTPATIENT
Start: 2024-05-16 | End: 2024-05-23

## 2024-05-16 RX ORDER — KETOROLAC TROMETHAMINE 10 MG/1
10 TABLET, FILM COATED ORAL EVERY 6 HOURS PRN
Qty: 20 TABLET | Refills: 2 | Status: SHIPPED | OUTPATIENT
Start: 2024-05-16

## 2024-05-21 RX ORDER — ALBUTEROL SULFATE 90 UG/1
2 AEROSOL, METERED RESPIRATORY (INHALATION) 4 TIMES DAILY PRN
Qty: 18 G | Refills: 1 | Status: SHIPPED | OUTPATIENT
Start: 2024-05-21

## 2024-06-03 DIAGNOSIS — M79.7 FIBROMYALGIA: ICD-10-CM

## 2024-06-03 RX ORDER — GABAPENTIN 400 MG/1
CAPSULE ORAL
Qty: 120 CAPSULE | Refills: 2 | Status: SHIPPED | OUTPATIENT
Start: 2024-06-03 | End: 2024-07-01

## 2024-08-06 RX ORDER — PREDNISONE 10 MG/1
TABLET ORAL
Qty: 1 EACH | Refills: 0 | Status: SHIPPED | OUTPATIENT
Start: 2024-08-06

## 2024-08-25 SDOH — ECONOMIC STABILITY: INCOME INSECURITY: HOW HARD IS IT FOR YOU TO PAY FOR THE VERY BASICS LIKE FOOD, HOUSING, MEDICAL CARE, AND HEATING?: NOT HARD AT ALL

## 2024-08-25 SDOH — ECONOMIC STABILITY: FOOD INSECURITY: WITHIN THE PAST 12 MONTHS, YOU WORRIED THAT YOUR FOOD WOULD RUN OUT BEFORE YOU GOT MONEY TO BUY MORE.: NEVER TRUE

## 2024-08-25 SDOH — ECONOMIC STABILITY: FOOD INSECURITY: WITHIN THE PAST 12 MONTHS, THE FOOD YOU BOUGHT JUST DIDN'T LAST AND YOU DIDN'T HAVE MONEY TO GET MORE.: NEVER TRUE

## 2024-08-26 ENCOUNTER — OFFICE VISIT (OUTPATIENT)
Age: 49
End: 2024-08-26
Payer: COMMERCIAL

## 2024-08-26 VITALS
RESPIRATION RATE: 19 BRPM | HEIGHT: 69 IN | OXYGEN SATURATION: 100 % | SYSTOLIC BLOOD PRESSURE: 100 MMHG | HEART RATE: 73 BPM | DIASTOLIC BLOOD PRESSURE: 68 MMHG | BODY MASS INDEX: 26.66 KG/M2 | WEIGHT: 180 LBS | TEMPERATURE: 98.4 F

## 2024-08-26 DIAGNOSIS — Z00.00 WELL EXAM WITHOUT ABNORMAL FINDINGS OF PATIENT 18 YEARS OF AGE OR OLDER: Primary | ICD-10-CM

## 2024-08-26 DIAGNOSIS — M79.7 FIBROMYALGIA: ICD-10-CM

## 2024-08-26 DIAGNOSIS — M45.9 ANKYLOSING SPONDYLITIS OF UNSPECIFIED SITES IN SPINE (HCC): ICD-10-CM

## 2024-08-26 DIAGNOSIS — F41.9 ANXIETY DISORDER, UNSPECIFIED TYPE: ICD-10-CM

## 2024-08-26 PROCEDURE — 99396 PREV VISIT EST AGE 40-64: CPT | Performed by: NURSE PRACTITIONER

## 2024-08-26 PROCEDURE — 99214 OFFICE O/P EST MOD 30 MIN: CPT | Performed by: NURSE PRACTITIONER

## 2024-08-26 RX ORDER — ALPRAZOLAM 0.25 MG
0.25 TABLET ORAL 2 TIMES DAILY PRN
Qty: 60 TABLET | Refills: 2 | Status: SHIPPED | OUTPATIENT
Start: 2024-08-26 | End: 2024-09-25

## 2024-08-26 RX ORDER — ALPRAZOLAM 0.25 MG
0.25 TABLET ORAL
COMMUNITY
Start: 2022-01-13 | End: 2024-08-26 | Stop reason: SDUPTHER

## 2024-08-26 RX ORDER — GABAPENTIN 400 MG/1
CAPSULE ORAL
Qty: 120 CAPSULE | Refills: 2 | Status: SHIPPED | OUTPATIENT
Start: 2024-08-26 | End: 2024-09-23

## 2024-08-26 RX ORDER — METHOCARBAMOL 750 MG/1
750 TABLET, FILM COATED ORAL 3 TIMES DAILY
Qty: 90 TABLET | Refills: 2 | Status: SHIPPED | OUTPATIENT
Start: 2024-08-26

## 2024-08-26 RX ORDER — MELOXICAM 15 MG/1
15 TABLET ORAL DAILY
Qty: 30 TABLET | Refills: 5 | Status: SHIPPED | OUTPATIENT
Start: 2024-08-26

## 2024-08-26 SDOH — ECONOMIC STABILITY: FOOD INSECURITY: WITHIN THE PAST 12 MONTHS, YOU WORRIED THAT YOUR FOOD WOULD RUN OUT BEFORE YOU GOT MONEY TO BUY MORE.: NEVER TRUE

## 2024-08-26 SDOH — ECONOMIC STABILITY: FOOD INSECURITY: WITHIN THE PAST 12 MONTHS, THE FOOD YOU BOUGHT JUST DIDN'T LAST AND YOU DIDN'T HAVE MONEY TO GET MORE.: NEVER TRUE

## 2024-08-26 SDOH — ECONOMIC STABILITY: INCOME INSECURITY: HOW HARD IS IT FOR YOU TO PAY FOR THE VERY BASICS LIKE FOOD, HOUSING, MEDICAL CARE, AND HEATING?: NOT HARD AT ALL

## 2024-08-26 NOTE — PROGRESS NOTES
Chief Complaint   Patient presents with    Follow-up    Medication Check    Lab Collection    Medication Refill     Patient is in the office today for a med check and labs.  Patient stated she started accupuntre.   No other concerns.    \"Have you been to the ER, urgent care clinic since your last visit?  Hospitalized since your last visit?\"    NO    “Have you seen or consulted any other health care providers outside of [facility] since your last visit?”    NO            Click Here for Release of Records Request

## 2024-09-30 RX ORDER — KETOROLAC TROMETHAMINE 10 MG/1
10 TABLET, FILM COATED ORAL EVERY 6 HOURS PRN
Qty: 20 TABLET | Refills: 2 | Status: SHIPPED | OUTPATIENT
Start: 2024-09-30

## 2024-09-30 RX ORDER — ROSUVASTATIN CALCIUM 5 MG/1
5 TABLET, COATED ORAL NIGHTLY
Qty: 90 TABLET | Refills: 1 | Status: SHIPPED | OUTPATIENT
Start: 2024-09-30

## 2024-12-30 DIAGNOSIS — F41.9 ANXIETY DISORDER, UNSPECIFIED TYPE: ICD-10-CM

## 2024-12-30 DIAGNOSIS — M79.7 FIBROMYALGIA: ICD-10-CM

## 2024-12-30 DIAGNOSIS — M45.9 ANKYLOSING SPONDYLITIS OF UNSPECIFIED SITES IN SPINE (HCC): ICD-10-CM

## 2024-12-30 RX ORDER — ALPRAZOLAM 0.25 MG/1
TABLET ORAL
Qty: 60 TABLET | Refills: 1 | Status: SHIPPED | OUTPATIENT
Start: 2024-12-30 | End: 2025-01-29

## 2024-12-30 RX ORDER — METHOCARBAMOL 750 MG/1
750 TABLET, FILM COATED ORAL 3 TIMES DAILY
Qty: 90 TABLET | Refills: 2 | Status: SHIPPED | OUTPATIENT
Start: 2024-12-30

## 2024-12-30 RX ORDER — GABAPENTIN 400 MG/1
CAPSULE ORAL
Qty: 120 CAPSULE | Refills: 1 | Status: SHIPPED | OUTPATIENT
Start: 2024-12-30 | End: 2025-01-27

## 2025-01-02 RX ORDER — CIPROFLOXACIN 500 MG/1
500 TABLET, FILM COATED ORAL 2 TIMES DAILY
Qty: 14 TABLET | Refills: 0 | Status: SHIPPED | OUTPATIENT
Start: 2025-01-02 | End: 2025-01-09

## 2025-01-02 RX ORDER — FLUCONAZOLE 150 MG/1
150 TABLET ORAL ONCE
Qty: 2 TABLET | Refills: 0 | Status: SHIPPED | OUTPATIENT
Start: 2025-01-02 | End: 2025-01-02

## 2025-02-16 SDOH — ECONOMIC STABILITY: TRANSPORTATION INSECURITY
IN THE PAST 12 MONTHS, HAS THE LACK OF TRANSPORTATION KEPT YOU FROM MEDICAL APPOINTMENTS OR FROM GETTING MEDICATIONS?: NO

## 2025-02-16 SDOH — ECONOMIC STABILITY: FOOD INSECURITY: WITHIN THE PAST 12 MONTHS, THE FOOD YOU BOUGHT JUST DIDN'T LAST AND YOU DIDN'T HAVE MONEY TO GET MORE.: NEVER TRUE

## 2025-02-16 SDOH — ECONOMIC STABILITY: FOOD INSECURITY: WITHIN THE PAST 12 MONTHS, YOU WORRIED THAT YOUR FOOD WOULD RUN OUT BEFORE YOU GOT MONEY TO BUY MORE.: NEVER TRUE

## 2025-02-16 SDOH — ECONOMIC STABILITY: INCOME INSECURITY: IN THE LAST 12 MONTHS, WAS THERE A TIME WHEN YOU WERE NOT ABLE TO PAY THE MORTGAGE OR RENT ON TIME?: NO

## 2025-02-17 ENCOUNTER — OFFICE VISIT (OUTPATIENT)
Facility: CLINIC | Age: 50
End: 2025-02-17
Payer: COMMERCIAL

## 2025-02-17 VITALS
DIASTOLIC BLOOD PRESSURE: 64 MMHG | WEIGHT: 188 LBS | TEMPERATURE: 97.6 F | SYSTOLIC BLOOD PRESSURE: 112 MMHG | HEIGHT: 69 IN | BODY MASS INDEX: 27.85 KG/M2 | HEART RATE: 76 BPM | OXYGEN SATURATION: 99 %

## 2025-02-17 DIAGNOSIS — M79.7 FIBROMYALGIA: ICD-10-CM

## 2025-02-17 DIAGNOSIS — M45.9 ANKYLOSING SPONDYLITIS OF UNSPECIFIED SITES IN SPINE (HCC): ICD-10-CM

## 2025-02-17 DIAGNOSIS — F41.9 ANXIETY DISORDER, UNSPECIFIED TYPE: ICD-10-CM

## 2025-02-17 DIAGNOSIS — E78.00 HYPERCHOLESTEROLEMIA: Primary | ICD-10-CM

## 2025-02-17 PROCEDURE — 99214 OFFICE O/P EST MOD 30 MIN: CPT | Performed by: NURSE PRACTITIONER

## 2025-02-17 RX ORDER — GABAPENTIN 400 MG/1
CAPSULE ORAL
Qty: 120 CAPSULE | Refills: 1 | Status: SHIPPED | OUTPATIENT
Start: 2025-02-17 | End: 2025-03-17

## 2025-02-17 RX ORDER — ROSUVASTATIN CALCIUM 5 MG/1
5 TABLET, COATED ORAL NIGHTLY
Qty: 90 TABLET | Refills: 3 | Status: SHIPPED | OUTPATIENT
Start: 2025-02-17

## 2025-02-17 RX ORDER — ALPRAZOLAM 0.25 MG
0.25 TABLET ORAL PRN
COMMUNITY
Start: 2025-01-31

## 2025-02-17 RX ORDER — MELOXICAM 15 MG/1
15 TABLET ORAL DAILY
Qty: 90 TABLET | Refills: 3 | Status: SHIPPED | OUTPATIENT
Start: 2025-02-17

## 2025-02-17 RX ORDER — GLUCOSAMINE/D3/BOSWELLIA SERRA 1500MG-400
1 TABLET ORAL DAILY
COMMUNITY

## 2025-02-17 ASSESSMENT — PATIENT HEALTH QUESTIONNAIRE - PHQ9
2. FEELING DOWN, DEPRESSED OR HOPELESS: NOT AT ALL
SUM OF ALL RESPONSES TO PHQ QUESTIONS 1-9: 0
SUM OF ALL RESPONSES TO PHQ9 QUESTIONS 1 & 2: 0
SUM OF ALL RESPONSES TO PHQ QUESTIONS 1-9: 0
1. LITTLE INTEREST OR PLEASURE IN DOING THINGS: NOT AT ALL

## 2025-02-17 NOTE — PROGRESS NOTES
María Arndt (:  1975) is a 49 y.o. female, Established patient, here for evaluation of the following chief complaint(s):  Medication Refill         Assessment & Plan  1. Medication management.  Her gabapentin prescription is due for renewal this month. She has been using Toradol as needed, with two refills provided in 2024. Her meloxicam prescription is also due for renewal this month. She has a sufficient supply of methocarbamol. Her Crestor prescription will last until the end of 2025. Prescriptions for all necessary medications have been renewed and sent to the pharmacy.    2. Allergic rhinitis.  She reports taking an allergy pill every night due to her work environment. She has been experiencing persistent postnasal drip for the past week, which has not improved with her current allergy medication. Flonase has been recommended for additional relief.    3. Vitamin D deficiency.  Her recent labs indicate low vitamin D levels. She has increased her vitamin D intake to 10,000 IU daily.    4. Gastroesophageal reflux disease.  She inquired about the long-term use of Prilosec. It was clarified that Prilosec can be taken indefinitely, contrary to the information she received from a commercial.    Follow-up  The patient is scheduled for a follow-up visit in 2025.    Results  Laboratory Studies  Vitamin D level was low.  1. Hypercholesterolemia  -     rosuvastatin (CRESTOR) 5 MG tablet; Take 1 tablet by mouth nightly, Disp-90 tablet, R-3Normal  2. Fibromyalgia  -     gabapentin (NEURONTIN) 400 MG capsule; TAKE 1 CAPSULE BY MOUTH FOUR TIMES A DAY. MAX DAILY AMOUNT: 4 CAPSULES., Disp-120 capsule, R-1Normal  3. Ankylosing spondylitis of unspecified sites in spine (HCC)  -     meloxicam (MOBIC) 15 MG tablet; Take 1 tablet by mouth daily, Disp-90 tablet, R-3Normal  4. Anxiety disorder, unspecified type    Return for well exam and fasting labs Sept.       Subjective   History of

## 2025-02-17 NOTE — PROGRESS NOTES
Chief Complaint   Patient presents with    Medication Refill     \"Have you been to the ER, urgent care clinic since your last visit?  Hospitalized since your last visit?\"    NO    “Have you seen or consulted any other health care providers outside of [facility] since your last visit?”    NO     /64 (Site: Left Upper Arm, Position: Sitting)   Pulse 76   Temp 97.6 °F (36.4 °C) (Temporal)   Ht 1.753 m (5' 9\")   Wt 85.3 kg (188 lb)   SpO2 99%   BMI 27.76 kg/m²    PHQ-9 Total Score: 0 (2025  3:30 PM)     [facility] Social Determinants Of Health (Sdoh) Screening Questionnaire       Question 2025  5:36 PM EST - Filed by Patient 2024  5:39 PM EST - Filed by Patient    Within the past 12 months, you worried that your food would run out before you got the money to buy more. Never true Never true    Within the past 12 months, the food you bought just didn't last and you didn't have money to get more. Never true Never true    In the past 12 months, has lack of transportation kept you from medical appointments or from getting medications? No       In the past 12 months, has lack of transportation kept you from meetings, work, or from getting things needed for daily living? No No    In the last 12 months, was there a time when you were not able to pay the mortgage or rent on time? No       In the past 12 months, how many times have you moved where you were living? 0       At any time in the past 12 months, were you homeless or living in a shelter (including now)? No No    In the past 12 months has the electric, gas, oil, or water company threatened to shut off services in your home? No       Would you like resources for any of these topics? No, thank you No, thank you                     Click Here for Release of Records Request     Identified Patient with 2 Patient Identifiers-Name and

## 2025-02-28 DIAGNOSIS — F41.9 ANXIETY DISORDER, UNSPECIFIED TYPE: ICD-10-CM

## 2025-03-03 RX ORDER — ALPRAZOLAM 0.25 MG
TABLET ORAL
Qty: 60 TABLET | Refills: 1 | Status: SHIPPED | OUTPATIENT
Start: 2025-03-03 | End: 2025-04-02

## 2025-03-31 RX ORDER — KETOROLAC TROMETHAMINE 10 MG/1
10 TABLET, FILM COATED ORAL EVERY 6 HOURS PRN
Qty: 20 TABLET | Refills: 2 | Status: SHIPPED | OUTPATIENT
Start: 2025-03-31

## 2025-04-14 ENCOUNTER — APPOINTMENT (OUTPATIENT)
Facility: HOSPITAL | Age: 50
End: 2025-04-14
Payer: COMMERCIAL

## 2025-04-14 ENCOUNTER — HOSPITAL ENCOUNTER (EMERGENCY)
Facility: HOSPITAL | Age: 50
Discharge: HOME | End: 2025-04-14
Attending: EMERGENCY MEDICINE
Payer: COMMERCIAL

## 2025-04-14 VITALS
DIASTOLIC BLOOD PRESSURE: 70 MMHG | BODY MASS INDEX: 25.76 KG/M2 | RESPIRATION RATE: 18 BRPM | TEMPERATURE: 98.3 F | OXYGEN SATURATION: 99 % | SYSTOLIC BLOOD PRESSURE: 128 MMHG | WEIGHT: 170 LBS | HEART RATE: 78 BPM | HEIGHT: 68 IN

## 2025-04-14 DIAGNOSIS — R51.9 ACUTE NONINTRACTABLE HEADACHE, UNSPECIFIED HEADACHE TYPE: Primary | ICD-10-CM

## 2025-04-14 DIAGNOSIS — S09.90XA CLOSED HEAD INJURY, INITIAL ENCOUNTER: ICD-10-CM

## 2025-04-14 PROCEDURE — 99282 EMERGENCY DEPT VISIT SF MDM: CPT

## 2025-04-14 ASSESSMENT — LIFESTYLE VARIABLES
HOW MANY STANDARD DRINKS CONTAINING ALCOHOL DO YOU HAVE ON A TYPICAL DAY: PATIENT DOES NOT DRINK
HOW OFTEN DO YOU HAVE A DRINK CONTAINING ALCOHOL: NEVER

## 2025-04-14 ASSESSMENT — PAIN SCALES - GENERAL: PAINLEVEL_OUTOF10: 5

## 2025-04-14 ASSESSMENT — PAIN - FUNCTIONAL ASSESSMENT
PAIN_FUNCTIONAL_ASSESSMENT: 0-10
PAIN_FUNCTIONAL_ASSESSMENT: NONE - DENIES PAIN
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
## 2025-04-15 NOTE — DISCHARGE INSTRUCTIONS
Today you were seen following a head injury.  Your physical exam findings are reassuring.  We decided to hold off on any imaging of your head at this time given low suspicion for serious injury including bleeding.  As discussed, Tylenol, ice for treatment at home.  Please follow-up with your primary care doctor in regards to your visit today in 1 to 2 weeks.  If you develop any new, concerning symptoms or to experience a worsening of your symptoms, please return for reevaluation.
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
## 2025-04-30 DIAGNOSIS — M45.9 ANKYLOSING SPONDYLITIS OF UNSPECIFIED SITES IN SPINE (HCC): ICD-10-CM

## 2025-05-02 RX ORDER — METHOCARBAMOL 750 MG/1
750 TABLET, FILM COATED ORAL 3 TIMES DAILY
Qty: 90 TABLET | Refills: 2 | Status: SHIPPED | OUTPATIENT
Start: 2025-05-02

## 2025-05-31 DIAGNOSIS — F41.9 ANXIETY DISORDER, UNSPECIFIED TYPE: ICD-10-CM

## 2025-05-31 DIAGNOSIS — M79.7 FIBROMYALGIA: ICD-10-CM

## 2025-06-02 RX ORDER — ALPRAZOLAM 0.25 MG
TABLET ORAL
Qty: 60 TABLET | Refills: 1 | Status: SHIPPED | OUTPATIENT
Start: 2025-06-02 | End: 2025-07-02

## 2025-06-02 RX ORDER — GABAPENTIN 400 MG/1
CAPSULE ORAL
Qty: 120 CAPSULE | Refills: 0 | Status: SHIPPED | OUTPATIENT
Start: 2025-06-02 | End: 2025-06-30

## 2025-07-03 RX ORDER — KETOROLAC TROMETHAMINE 10 MG/1
10 TABLET, FILM COATED ORAL EVERY 6 HOURS PRN
Qty: 20 TABLET | Refills: 2 | Status: SHIPPED | OUTPATIENT
Start: 2025-07-03

## 2025-08-02 DIAGNOSIS — M45.9 ANKYLOSING SPONDYLITIS OF UNSPECIFIED SITES IN SPINE (HCC): ICD-10-CM

## 2025-08-02 DIAGNOSIS — F41.9 ANXIETY DISORDER, UNSPECIFIED TYPE: ICD-10-CM

## 2025-08-02 DIAGNOSIS — M79.7 FIBROMYALGIA: ICD-10-CM

## 2025-08-10 RX ORDER — ALPRAZOLAM 0.25 MG
TABLET ORAL
Qty: 60 TABLET | Refills: 0 | Status: SHIPPED | OUTPATIENT
Start: 2025-08-10 | End: 2025-09-09

## 2025-08-10 RX ORDER — METHOCARBAMOL 750 MG/1
750 TABLET, FILM COATED ORAL 3 TIMES DAILY
Qty: 90 TABLET | Refills: 2 | Status: SHIPPED | OUTPATIENT
Start: 2025-08-10

## 2025-08-10 RX ORDER — GABAPENTIN 400 MG/1
CAPSULE ORAL
Qty: 120 CAPSULE | Refills: 1 | Status: SHIPPED | OUTPATIENT
Start: 2025-08-10 | End: 2025-10-08

## 2025-08-11 DIAGNOSIS — M79.7 FIBROMYALGIA: ICD-10-CM

## 2025-08-11 DIAGNOSIS — M45.9 ANKYLOSING SPONDYLITIS OF UNSPECIFIED SITES IN SPINE (HCC): ICD-10-CM

## 2025-08-11 DIAGNOSIS — F41.9 ANXIETY DISORDER, UNSPECIFIED TYPE: ICD-10-CM

## 2025-08-11 RX ORDER — ALPRAZOLAM 0.25 MG
TABLET ORAL
Qty: 60 TABLET | Refills: 1 | Status: SHIPPED | OUTPATIENT
Start: 2025-08-11 | End: 2025-09-10

## 2025-08-11 RX ORDER — METHOCARBAMOL 750 MG/1
750 TABLET, FILM COATED ORAL 3 TIMES DAILY
Qty: 90 TABLET | Refills: 2 | Status: SHIPPED | OUTPATIENT
Start: 2025-08-11

## 2025-08-11 RX ORDER — GABAPENTIN 400 MG/1
CAPSULE ORAL
Qty: 120 CAPSULE | Refills: 0 | Status: SHIPPED | OUTPATIENT
Start: 2025-08-11 | End: 2025-09-08